# Patient Record
Sex: MALE | Race: WHITE | NOT HISPANIC OR LATINO | Employment: OTHER | URBAN - METROPOLITAN AREA
[De-identification: names, ages, dates, MRNs, and addresses within clinical notes are randomized per-mention and may not be internally consistent; named-entity substitution may affect disease eponyms.]

---

## 2017-01-05 ENCOUNTER — ALLSCRIPTS OFFICE VISIT (OUTPATIENT)
Dept: OTHER | Facility: OTHER | Age: 82
End: 2017-01-05

## 2017-01-13 ENCOUNTER — HOSPITAL ENCOUNTER (OUTPATIENT)
Dept: RADIOLOGY | Facility: MEDICAL CENTER | Age: 82
Discharge: HOME | End: 2017-01-13
Payer: MEDICARE

## 2017-01-13 ENCOUNTER — ALLSCRIPTS OFFICE VISIT (OUTPATIENT)
Dept: OTHER | Facility: OTHER | Age: 82
End: 2017-01-13

## 2017-01-13 DIAGNOSIS — Z47.1 AFTERCARE FOLLOWING JOINT REPLACEMENT SURGERY: ICD-10-CM

## 2017-01-13 PROCEDURE — 73560 X-RAY EXAM OF KNEE 1 OR 2: CPT

## 2017-01-23 DIAGNOSIS — I48.91 ATRIAL FIBRILLATION (HCC): ICD-10-CM

## 2017-01-25 ENCOUNTER — HOSPITAL ENCOUNTER (OUTPATIENT)
Dept: NON INVASIVE DIAGNOSTICS | Facility: CLINIC | Age: 82
Discharge: HOME | End: 2017-01-25
Payer: MEDICARE

## 2017-01-25 DIAGNOSIS — I65.29 OCCLUSION AND STENOSIS OF UNSPECIFIED CAROTID ARTERY: ICD-10-CM

## 2017-01-25 PROCEDURE — 93880 EXTRACRANIAL BILAT STUDY: CPT

## 2017-01-31 ENCOUNTER — GENERIC CONVERSION - ENCOUNTER (OUTPATIENT)
Dept: OTHER | Facility: OTHER | Age: 82
End: 2017-01-31

## 2017-02-23 DIAGNOSIS — I48.91 ATRIAL FIBRILLATION (HCC): ICD-10-CM

## 2017-03-08 ENCOUNTER — ALLSCRIPTS OFFICE VISIT (OUTPATIENT)
Dept: OTHER | Facility: OTHER | Age: 82
End: 2017-03-08

## 2017-04-13 DIAGNOSIS — I48.91 ATRIAL FIBRILLATION (HCC): ICD-10-CM

## 2017-04-14 ENCOUNTER — ALLSCRIPTS OFFICE VISIT (OUTPATIENT)
Dept: OTHER | Facility: OTHER | Age: 82
End: 2017-04-14

## 2017-05-01 ENCOUNTER — GENERIC CONVERSION - ENCOUNTER (OUTPATIENT)
Dept: OTHER | Facility: OTHER | Age: 82
End: 2017-05-01

## 2017-05-18 DIAGNOSIS — I48.91 ATRIAL FIBRILLATION (HCC): ICD-10-CM

## 2017-06-05 ENCOUNTER — ALLSCRIPTS OFFICE VISIT (OUTPATIENT)
Dept: OTHER | Facility: OTHER | Age: 82
End: 2017-06-05

## 2017-06-13 ENCOUNTER — LAB CONVERSION - ENCOUNTER (OUTPATIENT)
Dept: OTHER | Facility: OTHER | Age: 82
End: 2017-06-13

## 2017-06-13 ENCOUNTER — APPOINTMENT (OUTPATIENT)
Dept: LAB | Facility: HOSPITAL | Age: 82
End: 2017-06-13
Payer: MEDICARE

## 2017-06-13 ENCOUNTER — LAB REQUISITION (OUTPATIENT)
Dept: LAB | Facility: HOSPITAL | Age: 82
End: 2017-06-13
Payer: MEDICARE

## 2017-06-13 DIAGNOSIS — N40.1 ENLARGED PROSTATE WITH URINARY OBSTRUCTION: Primary | ICD-10-CM

## 2017-06-13 DIAGNOSIS — N40.1 ENLARGED PROSTATE WITH LOWER URINARY TRACT SYMPTOMS (LUTS): ICD-10-CM

## 2017-06-13 DIAGNOSIS — N13.8 ENLARGED PROSTATE WITH URINARY OBSTRUCTION: Primary | ICD-10-CM

## 2017-06-13 PROCEDURE — 88112 CYTOPATH CELL ENHANCE TECH: CPT

## 2017-06-13 PROCEDURE — 87086 URINE CULTURE/COLONY COUNT: CPT | Performed by: UROLOGY

## 2017-06-14 LAB — BACTERIA UR CULT: NORMAL

## 2017-06-29 DIAGNOSIS — I48.91 ATRIAL FIBRILLATION (HCC): ICD-10-CM

## 2017-07-05 ENCOUNTER — ALLSCRIPTS OFFICE VISIT (OUTPATIENT)
Dept: OTHER | Facility: OTHER | Age: 82
End: 2017-07-05

## 2017-07-05 ENCOUNTER — GENERIC CONVERSION - ENCOUNTER (OUTPATIENT)
Dept: OTHER | Facility: OTHER | Age: 82
End: 2017-07-05

## 2017-08-03 ENCOUNTER — HOSPITAL ENCOUNTER (EMERGENCY)
Facility: HOSPITAL | Age: 82
Discharge: HOME | End: 2017-08-03
Attending: EMERGENCY MEDICINE | Admitting: EMERGENCY MEDICINE
Payer: MEDICARE

## 2017-08-03 ENCOUNTER — APPOINTMENT (EMERGENCY)
Dept: CT IMAGING | Facility: HOSPITAL | Age: 82
End: 2017-08-03
Payer: MEDICARE

## 2017-08-03 VITALS
SYSTOLIC BLOOD PRESSURE: 155 MMHG | WEIGHT: 182.32 LBS | OXYGEN SATURATION: 95 % | BODY MASS INDEX: 27.72 KG/M2 | TEMPERATURE: 98.2 F | DIASTOLIC BLOOD PRESSURE: 84 MMHG | RESPIRATION RATE: 18 BRPM | HEART RATE: 64 BPM

## 2017-08-03 DIAGNOSIS — S00.01XA SCALP ABRASION, INITIAL ENCOUNTER: ICD-10-CM
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
## 2017-08-03 DIAGNOSIS — I48.91 ATRIAL FIBRILLATION (HCC): ICD-10-CM

## 2017-08-03 DIAGNOSIS — S09.90XA HEAD INJURY, ACUTE, INITIAL ENCOUNTER: ICD-10-CM

## 2017-08-03 DIAGNOSIS — S50.01XA CONTUSION OF RIGHT ELBOW, INITIAL ENCOUNTER: ICD-10-CM

## 2017-08-03 DIAGNOSIS — S50.311A ABRASION OF RIGHT ELBOW, INITIAL ENCOUNTER: ICD-10-CM

## 2017-08-03 LAB
ANION GAP SERPL CALCULATED.3IONS-SCNC: 5 MMOL/L (ref 4–13)
ATRIAL RATE: 258 BPM
BASOPHILS # BLD AUTO: 0.03 THOUSANDS/ΜL (ref 0–0.1)
BASOPHILS NFR BLD AUTO: 1 % (ref 0–1)
BUN SERPL-MCNC: 13 MG/DL (ref 5–25)
CALCIUM SERPL-MCNC: 8.8 MG/DL (ref 8.3–10.1)
CHLORIDE SERPL-SCNC: 106 MMOL/L (ref 100–108)
CO2 SERPL-SCNC: 29 MMOL/L (ref 21–32)
CREAT SERPL-MCNC: 0.88 MG/DL (ref 0.6–1.3)
EOSINOPHIL # BLD AUTO: 0.07 THOUSAND/ΜL (ref 0–0.61)
EOSINOPHIL NFR BLD AUTO: 1 % (ref 0–6)
ERYTHROCYTE [DISTWIDTH] IN BLOOD BY AUTOMATED COUNT: 14.6 % (ref 11.6–15.1)
GFR SERPL CREATININE-BSD FRML MDRD: 75 ML/MIN/1.73SQ M
GLUCOSE SERPL-MCNC: 84 MG/DL (ref 65–140)
HCT VFR BLD AUTO: 45.8 % (ref 36.5–49.3)
HGB BLD-MCNC: 15.1 G/DL (ref 12–17)
INR PPP: 2.62 (ref 0.86–1.16)
LYMPHOCYTES # BLD AUTO: 1.57 THOUSANDS/ΜL (ref 0.6–4.47)
LYMPHOCYTES NFR BLD AUTO: 24 % (ref 14–44)
MCH RBC QN AUTO: 29.4 PG (ref 26.8–34.3)
MCHC RBC AUTO-ENTMCNC: 33 G/DL (ref 31.4–37.4)
MCV RBC AUTO: 89 FL (ref 82–98)
MONOCYTES # BLD AUTO: 0.67 THOUSAND/ΜL (ref 0.17–1.22)
MONOCYTES NFR BLD AUTO: 10 % (ref 4–12)
NEUTROPHILS # BLD AUTO: 4.3 THOUSANDS/ΜL (ref 1.85–7.62)
NEUTS SEG NFR BLD AUTO: 64 % (ref 43–75)
PLATELET # BLD AUTO: 157 THOUSANDS/UL (ref 149–390)
PMV BLD AUTO: 10.8 FL (ref 8.9–12.7)
POTASSIUM SERPL-SCNC: 4.1 MMOL/L (ref 3.5–5.3)
PROTHROMBIN TIME: 29 SECONDS (ref 12.1–14.4)
QRS AXIS: 28 DEGREES
QRSD INTERVAL: 90 MS
QT INTERVAL: 374 MS
QTC INTERVAL: 395 MS
RBC # BLD AUTO: 5.13 MILLION/UL (ref 3.88–5.62)
SODIUM SERPL-SCNC: 140 MMOL/L (ref 136–145)
T WAVE AXIS: 33 DEGREES
TROPONIN I SERPL-MCNC: <0.02 NG/ML
TSH SERPL DL<=0.05 MIU/L-ACNC: 2.21 UIU/ML (ref 0.36–3.74)
VENTRICULAR RATE: 67 BPM
WBC # BLD AUTO: 6.64 THOUSAND/UL (ref 4.31–10.16)

## 2017-08-03 PROCEDURE — 99284 EMERGENCY DEPT VISIT MOD MDM: CPT

## 2017-08-03 PROCEDURE — 93005 ELECTROCARDIOGRAM TRACING: CPT

## 2017-08-03 PROCEDURE — 80048 BASIC METABOLIC PNL TOTAL CA: CPT | Performed by: EMERGENCY MEDICINE

## 2017-08-03 PROCEDURE — 96360 HYDRATION IV INFUSION INIT: CPT

## 2017-08-03 PROCEDURE — 85025 COMPLETE CBC W/AUTO DIFF WBC: CPT | Performed by: EMERGENCY MEDICINE

## 2017-08-03 PROCEDURE — 84484 ASSAY OF TROPONIN QUANT: CPT | Performed by: EMERGENCY MEDICINE

## 2017-08-03 PROCEDURE — 84443 ASSAY THYROID STIM HORMONE: CPT | Performed by: EMERGENCY MEDICINE

## 2017-08-03 PROCEDURE — 85610 PROTHROMBIN TIME: CPT | Performed by: EMERGENCY MEDICINE

## 2017-08-03 PROCEDURE — 70450 CT HEAD/BRAIN W/O DYE: CPT

## 2017-08-03 PROCEDURE — 36415 COLL VENOUS BLD VENIPUNCTURE: CPT | Performed by: EMERGENCY MEDICINE

## 2017-08-03 RX ORDER — LEVOTHYROXINE SODIUM 88 UG/1
88 TABLET ORAL DAILY
COMMUNITY

## 2017-08-03 RX ORDER — AMLODIPINE BESYLATE 2.5 MG/1
5 TABLET ORAL DAILY
COMMUNITY
End: 2017-11-11 | Stop reason: HOSPADM

## 2017-08-03 RX ORDER — WARFARIN SODIUM 3 MG/1
1.5 TABLET ORAL
COMMUNITY

## 2017-08-03 RX ADMIN — SODIUM CHLORIDE 500 ML: 0.9 INJECTION, SOLUTION INTRAVENOUS at 11:07

## 2017-09-08 DIAGNOSIS — I48.91 ATRIAL FIBRILLATION (HCC): ICD-10-CM

## 2017-09-08 DIAGNOSIS — E03.9 HYPOTHYROIDISM: ICD-10-CM

## 2017-10-03 ENCOUNTER — GENERIC CONVERSION - ENCOUNTER (OUTPATIENT)
Dept: OTHER | Facility: OTHER | Age: 82
End: 2017-10-03

## 2017-10-12 DIAGNOSIS — I48.91 ATRIAL FIBRILLATION (HCC): ICD-10-CM

## 2017-10-12 DIAGNOSIS — Z47.1 AFTERCARE FOLLOWING JOINT REPLACEMENT SURGERY: ICD-10-CM

## 2017-10-13 ENCOUNTER — ALLSCRIPTS OFFICE VISIT (OUTPATIENT)
Dept: OTHER | Facility: OTHER | Age: 82
End: 2017-10-13

## 2017-10-13 ENCOUNTER — APPOINTMENT (OUTPATIENT)
Dept: RADIOLOGY | Facility: MEDICAL CENTER | Age: 82
End: 2017-10-13
Payer: MEDICARE

## 2017-10-13 DIAGNOSIS — Z47.1 AFTERCARE FOLLOWING JOINT REPLACEMENT SURGERY: ICD-10-CM

## 2017-10-13 PROCEDURE — 73560 X-RAY EXAM OF KNEE 1 OR 2: CPT

## 2017-10-14 NOTE — PROGRESS NOTES
Assessment  1  Chronic pain of right knee (647 20,826 41) (M25 561,G04 29)    One year following right total knee replacement, is a well clinical radiographic exam   He understands antibiotic prophylaxis is indicated prior to invasive procedures for the next 12 months  I would welcome the opportunity see him back in the office should problems arise     Plan  Aftercare following right knee joint replacement surgery    · XR KNEE 1 OR 2 VIEW RIGHT; Status:Active - Retrospective By Protocol Authorization; Requested for:13Oct2017;   Chronic pain of right knee    · Follow-up PRN Evaluation and Treatment  Follow-up  Status: Complete  Done:  [id number] 11:30AM    History of Present Illness  HPI: One year following right total knee replacement, this 51-year-old gentleman describes very little pain in the right knee  He is lays with her reduce pain, and improved performance his right knee surgery has offered him      Review of Systems    Constitutional: No fever or chills, feels well, no tiredness, no recent weight loss or weight gain  Eyes: No complaints of red eyes, no eyesight problems  ENT: no complaints of loss of hearing, no nosebleeds, no sore throat  Cardiovascular: No complaints of chest pain, no palpitations, no leg claudication or lower extremity edema  Respiratory: No complaints of shortness of breath, no wheezing, no cough  Gastrointestinal: No complaints of abdominal pain, no constipation, no nausea or vomiting, no diarrhea or bloody stools  Genitourinary: No complaints of dysuria or incontinence, no hesitancy, no nocturia  Musculoskeletal: as noted in HPI  Integumentary: No complaints of skin rash or lesion, no itching or dry skin, no skin wounds  Neurological: No complaints of headache, no confusion, no numbness or tingling, no dizziness  Psychiatric: No suicidal thoughts, no anxiety, no depression     Endocrine: No muscle weakness, no frequent urination, no excessive thirst, no feelings of weakness  Active Problems  1  AAA (abdominal aortic aneurysm) (441 4) (I71 4)   2  Aftercare following right knee joint replacement surgery (V54 81,V43 65) (Z47 1,Z96 651)   3  AI (aortic insufficiency) (424 1) (I35 1)   4  Atrial fibrillation (427 31) (I48 91)   5  BPH (benign prostatic hyperplasia) (600 00) (N40 0)   6  Carotid stenosis (433 10) (I65 29)   7  Chronic pain of right knee (365 33,292 46) (M25 561,G89 29)   8  Cough (786 2) (R05)   9  Edema of both legs (782 3) (R60 0)   10  Hematuria, microscopic (599 72) (R31 29)   11  Hyperlipidemia (272 4) (E78 5)   12  Hypertension (401 9) (I10)   13  Hypocalcemia (275 41) (E83 51)   14  Hypokalemia (276 8) (E87 6)   15  Hypothyroidism (244 9) (E03 9)   16  Influenza (487 1) (J11 1)   17  Knee pain, right (719 46) (M25 561)   18  Lumbar foraminal stenosis (724 02) (M99 83)   19  Physician orders for life-sustaining treatment (POLST) form indicates patient wish for full    code resuscitation status (V49 89) (Z78 9)   20  Preoperative cardiovascular examination (V72 81) (Z01 810)   21  Primary osteoarthritis of right knee (715 16) (M17 11)   22  Skin lesion of face (709 9) (L98 9)   23  Somnolence, daytime (780 54) (R40 0)   24  Stasis edema of right lower extremity (459 30) (I87 301)   25  Status post total right knee replacement (V43 65) (Z96 651)   26  Swelling of calf (729 81) (M79 89)   27  Thrombocytopenia (287 5) (D69 6)   28   Urge incontinence of urine (788 31) (N39 41)    Past Medical History   · History of Abdominal aortic aneurysm, without rupture (441 4) (I71 4)   · History of amaurosis fugax (V12 49) (Z86 69)   · History of urinary tract infection (V13 02) (Z87 440)   · History of Hyperthyroidism (242 90) (E05 90)   · History of Inguinal hernia (550 90) (K40 90)   · History of Postoperative state (V45 89) (Z98 890)   · History of Shoulder pain (719 41) (M25 519)   · History of Syncope (780 2) (R55)    The active problems and past medical history were reviewed and updated today  Surgical History   · History of Endarterectomy Internal Carotid With Eversion And End-To-End Anastomosis   · History of Hernia Repair   · History of Total Knee Replacement Right    The surgical history was reviewed and updated today  Family History  Mother    · No pertinent family history    Social History   · Being A Social Drinker   · Former smoker (G59 12) (Y52 503)   ·     Current Meds   1  AmLODIPine Besylate 2 5 MG Oral Tablet; Take 1 tablet twice daily; Therapy: 52MRE7522 to (Evaluate:11Jul2017)  Requested for: 22KFU0434; Last   Rx:13Mar2017 Ordered   2  Atorvastatin Calcium 20 MG Oral Tablet; take 0 5 tablet daily; Therapy: 45SFA3520 to (Evaluate:06Jun2017)  Requested for: 29HZF3547; Last   Rx:08Mar2017 Ordered   3  EQL Fish Oil 1000 MG Oral Capsule; TAKE 1 CAPSULE DAILY; Therapy: 94MZJ5619 to (Evaluate:28Nov2017); Last Rx:10Nov2014 Ordered   4  Zoe 0 5-0 4 MG Oral Capsule (Dutasteride-Tamsulosin HCl); take 1 capsule daily; Therapy: 25LQN4475 to (Evaluate:28Nov2017); Last Rx:10Nov2014 Ordered   5  Levothyroxine Sodium 88 MCG Oral Tablet; TAKE 1 TABLET DAILY; Therapy: 13HIN9381 to [phone])  Requested for: 30Jun2017; Last   Rx:05Jun2017 Ordered   6  Potassium Chloride Debi ER 20 MEQ Oral Tablet Extended Release; take 4 tablets daily   as directed; Therapy: 15Bvk5928 to [phone])  Requested for: 32ZUF3676; Last   Rx:30Jan2017 Ordered   7  Vitamin D 1000 UNIT CAPS; TAKE 2 CAPSULE Daily; Therapy: 69VSY2288 to (Evaluate:87Xpt2635); Last Rx:10Nov2014 Ordered   8  Warfarin Sodium 3 MG Oral Tablet; Take 1/2 to 1 tablet daily by mouth or as ordered by   physician; Therapy: 83LRA9198 to (Evaluate:25Jan2018)  Requested for: 61IGZ4061; Last   Rx:30Jan2017 Ordered    Allergies  1   Penicillins    Vitals  Signs   Heart Rate: 66  Systolic: 832  Diastolic: 68  Height: 5 ft 10 in  Weight: 179 lb   BMI Calculated: 25 68  BSA Calculated: 1 99    Physical Exam  He pattern is without an amateur assistive eyes  Breathing is not labored  Right thigh devoid of atrophy  Right knee is a healed anterior incision  There is no effusion  There is no warmth  Extensions full flexion is excellent  There is no mid flexion valgus instability  There is no tenderness of the right calf  Results/Data  I personally reviewed the films/images/results in the office today  My interpretation follows  X-ray Review 2 views of the right knee show totally implant in satisfactory position  Future Appointments    Date/Time Provider Specialty Site   12/06/2017 09:00 AM Corrinne Imam, M D   [facility] [place] [facility]     Signatures   Electronically signed by : OWEN Mark ; Oct 13 2017 11:30AM EST                       (Author)

## 2017-10-17 ENCOUNTER — GENERIC CONVERSION - ENCOUNTER (OUTPATIENT)
Dept: OTHER | Facility: OTHER | Age: 82
End: 2017-10-17

## 2017-11-06 ENCOUNTER — HOSPITAL ENCOUNTER (INPATIENT)
Facility: HOSPITAL | Age: 82
LOS: 4 days | Discharge: NURSING, REHAB OR LONG-TERM CARE | DRG: 300 | End: 2017-11-11
Attending: EMERGENCY MEDICINE | Admitting: INTERNAL MEDICINE
Payer: MEDICARE

## 2017-11-06 ENCOUNTER — APPOINTMENT (EMERGENCY)
Dept: RADIOLOGY | Facility: HOSPITAL | Age: 82
DRG: 300 | End: 2017-11-06
Payer: MEDICARE

## 2017-11-06 DIAGNOSIS — I71.01 DESCENDING THORACIC AORTIC DISSECTION (HCC): Primary | ICD-10-CM

## 2017-11-06 LAB
ALBUMIN SERPL BCP-MCNC: 3.2 G/DL (ref 3.5–5)
ALP SERPL-CCNC: 65 U/L (ref 46–116)
ALT SERPL W P-5'-P-CCNC: 25 U/L (ref 12–78)
ANION GAP BLD CALC-SCNC: 13 MMOL/L (ref 4–13)
ANION GAP SERPL CALCULATED.3IONS-SCNC: 6 MMOL/L (ref 4–13)
APTT PPP: 42 SECONDS (ref 23–35)
AST SERPL W P-5'-P-CCNC: 40 U/L (ref 5–45)
BASOPHILS # BLD AUTO: 0.03 THOUSANDS/ΜL (ref 0–0.1)
BASOPHILS NFR BLD AUTO: 0 % (ref 0–1)
BILIRUB SERPL-MCNC: 0.64 MG/DL (ref 0.2–1)
BUN BLD-MCNC: 26 MG/DL (ref 5–25)
BUN SERPL-MCNC: 21 MG/DL (ref 5–25)
CA-I BLD-SCNC: 1.13 MMOL/L (ref 1.12–1.32)
CALCIUM SERPL-MCNC: 8.7 MG/DL (ref 8.3–10.1)
CHLORIDE BLD-SCNC: 107 MMOL/L (ref 100–108)
CHLORIDE SERPL-SCNC: 110 MMOL/L (ref 100–108)
CO2 SERPL-SCNC: 22 MMOL/L (ref 21–32)
CREAT BLD-MCNC: 0.9 MG/DL (ref 0.6–1.3)
CREAT SERPL-MCNC: 0.88 MG/DL (ref 0.6–1.3)
EOSINOPHIL # BLD AUTO: 0.13 THOUSAND/ΜL (ref 0–0.61)
EOSINOPHIL NFR BLD AUTO: 2 % (ref 0–6)
ERYTHROCYTE [DISTWIDTH] IN BLOOD BY AUTOMATED COUNT: 13.6 % (ref 11.6–15.1)
GFR SERPL CREATININE-BSD FRML MDRD: 74 ML/MIN/1.73SQ M
GFR SERPL CREATININE-BSD FRML MDRD: 75 ML/MIN/1.73SQ M
GLUCOSE SERPL-MCNC: 101 MG/DL (ref 65–140)
GLUCOSE SERPL-MCNC: 107 MG/DL (ref 65–140)
HCT VFR BLD AUTO: 47 % (ref 36.5–49.3)
HCT VFR BLD CALC: 48 % (ref 36.5–49.3)
HGB BLD-MCNC: 16.2 G/DL (ref 12–17)
HGB BLDA-MCNC: 16.3 G/DL (ref 12–17)
INR PPP: 2.56 (ref 0.86–1.16)
LYMPHOCYTES # BLD AUTO: 3.26 THOUSANDS/ΜL (ref 0.6–4.47)
LYMPHOCYTES NFR BLD AUTO: 39 % (ref 14–44)
MCH RBC QN AUTO: 30.5 PG (ref 26.8–34.3)
MCHC RBC AUTO-ENTMCNC: 34.5 G/DL (ref 31.4–37.4)
MCV RBC AUTO: 89 FL (ref 82–98)
MONOCYTES # BLD AUTO: 0.92 THOUSAND/ΜL (ref 0.17–1.22)
MONOCYTES NFR BLD AUTO: 11 % (ref 4–12)
NEUTROPHILS # BLD AUTO: 4.01 THOUSANDS/ΜL (ref 1.85–7.62)
NEUTS SEG NFR BLD AUTO: 48 % (ref 43–75)
NRBC BLD AUTO-RTO: 0 /100 WBCS
NT-PROBNP SERPL-MCNC: 691 PG/ML
PCO2 BLD: 25 MMOL/L (ref 21–32)
PLATELET # BLD AUTO: 147 THOUSANDS/UL (ref 149–390)
PMV BLD AUTO: 11.5 FL (ref 8.9–12.7)
POTASSIUM BLD-SCNC: 4.8 MMOL/L (ref 3.5–5.3)
POTASSIUM SERPL-SCNC: 5.8 MMOL/L (ref 3.5–5.3)
PROT SERPL-MCNC: 6.8 G/DL (ref 6.4–8.2)
PROTHROMBIN TIME: 27.8 SECONDS (ref 12.1–14.4)
RBC # BLD AUTO: 5.31 MILLION/UL (ref 3.88–5.62)
SODIUM BLD-SCNC: 139 MMOL/L (ref 136–145)
SODIUM SERPL-SCNC: 138 MMOL/L (ref 136–145)
SPECIMEN SOURCE: ABNORMAL
SPECIMEN SOURCE: NORMAL
TROPONIN I BLD-MCNC: 0 NG/ML (ref 0–0.08)
WBC # BLD AUTO: 8.39 THOUSAND/UL (ref 4.31–10.16)

## 2017-11-06 PROCEDURE — 85014 HEMATOCRIT: CPT

## 2017-11-06 PROCEDURE — 93005 ELECTROCARDIOGRAM TRACING: CPT | Performed by: EMERGENCY MEDICINE

## 2017-11-06 PROCEDURE — 85025 COMPLETE CBC W/AUTO DIFF WBC: CPT | Performed by: EMERGENCY MEDICINE

## 2017-11-06 PROCEDURE — 36415 COLL VENOUS BLD VENIPUNCTURE: CPT

## 2017-11-06 PROCEDURE — 85730 THROMBOPLASTIN TIME PARTIAL: CPT | Performed by: EMERGENCY MEDICINE

## 2017-11-06 PROCEDURE — 84484 ASSAY OF TROPONIN QUANT: CPT

## 2017-11-06 PROCEDURE — 74175 CTA ABDOMEN W/CONTRAST: CPT

## 2017-11-06 PROCEDURE — 85610 PROTHROMBIN TIME: CPT | Performed by: EMERGENCY MEDICINE

## 2017-11-06 PROCEDURE — 83880 ASSAY OF NATRIURETIC PEPTIDE: CPT | Performed by: EMERGENCY MEDICINE

## 2017-11-06 PROCEDURE — 71275 CT ANGIOGRAPHY CHEST: CPT

## 2017-11-06 PROCEDURE — 80053 COMPREHEN METABOLIC PANEL: CPT | Performed by: EMERGENCY MEDICINE

## 2017-11-06 PROCEDURE — 80047 BASIC METABLC PNL IONIZED CA: CPT

## 2017-11-06 PROCEDURE — 96361 HYDRATE IV INFUSION ADD-ON: CPT

## 2017-11-06 RX ORDER — ESMOLOL HYDROCHLORIDE 10 MG/ML
25-200 INJECTION, SOLUTION INTRAVENOUS
Status: DISCONTINUED | OUTPATIENT
Start: 2017-11-06 | End: 2017-11-08

## 2017-11-06 RX ORDER — PHYTONADIONE 5 MG/1
10 TABLET ORAL ONCE
Status: COMPLETED | OUTPATIENT
Start: 2017-11-06 | End: 2017-11-07

## 2017-11-06 RX ORDER — ASCORBIC ACID 500 MG
500 TABLET ORAL DAILY
Status: ON HOLD | COMMUNITY
End: 2017-11-07 | Stop reason: ALTCHOICE

## 2017-11-06 RX ADMIN — IOHEXOL 100 ML: 350 INJECTION, SOLUTION INTRAVENOUS at 22:48

## 2017-11-06 RX ADMIN — SODIUM CHLORIDE 1000 ML: 0.9 INJECTION, SOLUTION INTRAVENOUS at 22:08

## 2017-11-07 ENCOUNTER — APPOINTMENT (INPATIENT)
Dept: NON INVASIVE DIAGNOSTICS | Facility: HOSPITAL | Age: 82
DRG: 300 | End: 2017-11-07
Payer: MEDICARE

## 2017-11-07 PROBLEM — I71.00 INTRAMURAL AORTIC HEMATOMA (HCC): Status: ACTIVE | Noted: 2017-11-07

## 2017-11-07 LAB
ANION GAP SERPL CALCULATED.3IONS-SCNC: 10 MMOL/L (ref 4–13)
ATRIAL RATE: 340 BPM
BASOPHILS # BLD AUTO: 0 THOUSANDS/ΜL (ref 0–0.1)
BASOPHILS NFR BLD AUTO: 0 % (ref 0–1)
BUN SERPL-MCNC: 18 MG/DL (ref 5–25)
CALCIUM SERPL-MCNC: 9 MG/DL (ref 8.3–10.1)
CHLORIDE SERPL-SCNC: 107 MMOL/L (ref 100–108)
CO2 SERPL-SCNC: 24 MMOL/L (ref 21–32)
CREAT SERPL-MCNC: 0.9 MG/DL (ref 0.6–1.3)
EOSINOPHIL # BLD AUTO: 0 THOUSAND/ΜL (ref 0–0.61)
EOSINOPHIL NFR BLD AUTO: 0 % (ref 0–6)
ERYTHROCYTE [DISTWIDTH] IN BLOOD BY AUTOMATED COUNT: 13.6 % (ref 11.6–15.1)
GFR SERPL CREATININE-BSD FRML MDRD: 74 ML/MIN/1.73SQ M
GLUCOSE SERPL-MCNC: 162 MG/DL (ref 65–140)
HCT VFR BLD AUTO: 48 % (ref 36.5–49.3)
HGB BLD-MCNC: 16.6 G/DL (ref 12–17)
INR PPP: 2.59 (ref 0.86–1.16)
LYMPHOCYTES # BLD AUTO: 1.03 THOUSANDS/ΜL (ref 0.6–4.47)
LYMPHOCYTES NFR BLD AUTO: 10 % (ref 14–44)
MAGNESIUM SERPL-MCNC: 2.2 MG/DL (ref 1.6–2.6)
MCH RBC QN AUTO: 30.9 PG (ref 26.8–34.3)
MCHC RBC AUTO-ENTMCNC: 34.6 G/DL (ref 31.4–37.4)
MCV RBC AUTO: 89 FL (ref 82–98)
MONOCYTES # BLD AUTO: 0.45 THOUSAND/ΜL (ref 0.17–1.22)
MONOCYTES NFR BLD AUTO: 5 % (ref 4–12)
NEUTROPHILS # BLD AUTO: 8.34 THOUSANDS/ΜL (ref 1.85–7.62)
NEUTS SEG NFR BLD AUTO: 85 % (ref 43–75)
NRBC BLD AUTO-RTO: 0 /100 WBCS
P AXIS: 207 DEGREES
PLATELET # BLD AUTO: 137 THOUSANDS/UL (ref 149–390)
PMV BLD AUTO: 12 FL (ref 8.9–12.7)
POTASSIUM SERPL-SCNC: 3.7 MMOL/L (ref 3.5–5.3)
PROTHROMBIN TIME: 28.1 SECONDS (ref 12.1–14.4)
QRS AXIS: 30 DEGREES
QRSD INTERVAL: 82 MS
QT INTERVAL: 400 MS
QTC INTERVAL: 389 MS
RBC # BLD AUTO: 5.38 MILLION/UL (ref 3.88–5.62)
SODIUM SERPL-SCNC: 141 MMOL/L (ref 136–145)
T WAVE AXIS: 30 DEGREES
VENTRICULAR RATE: 57 BPM
WBC # BLD AUTO: 9.86 THOUSAND/UL (ref 4.31–10.16)

## 2017-11-07 PROCEDURE — 93306 TTE W/DOPPLER COMPLETE: CPT

## 2017-11-07 PROCEDURE — 85025 COMPLETE CBC W/AUTO DIFF WBC: CPT | Performed by: EMERGENCY MEDICINE

## 2017-11-07 PROCEDURE — 96374 THER/PROPH/DIAG INJ IV PUSH: CPT

## 2017-11-07 PROCEDURE — 83735 ASSAY OF MAGNESIUM: CPT | Performed by: EMERGENCY MEDICINE

## 2017-11-07 PROCEDURE — 99285 EMERGENCY DEPT VISIT HI MDM: CPT

## 2017-11-07 PROCEDURE — 85610 PROTHROMBIN TIME: CPT | Performed by: EMERGENCY MEDICINE

## 2017-11-07 PROCEDURE — 80048 BASIC METABOLIC PNL TOTAL CA: CPT | Performed by: EMERGENCY MEDICINE

## 2017-11-07 RX ORDER — LEVOTHYROXINE SODIUM 88 UG/1
88 TABLET ORAL
Status: DISCONTINUED | OUTPATIENT
Start: 2017-11-07 | End: 2017-11-11 | Stop reason: HOSPADM

## 2017-11-07 RX ORDER — ASCORBIC ACID 500 MG
500 TABLET ORAL DAILY
Status: DISCONTINUED | OUTPATIENT
Start: 2017-11-07 | End: 2017-11-07

## 2017-11-07 RX ORDER — AMLODIPINE BESYLATE 5 MG/1
5 TABLET ORAL DAILY
Status: DISCONTINUED | OUTPATIENT
Start: 2017-11-07 | End: 2017-11-07

## 2017-11-07 RX ORDER — FENTANYL CITRATE 50 UG/ML
50 INJECTION, SOLUTION INTRAMUSCULAR; INTRAVENOUS ONCE
Status: DISCONTINUED | OUTPATIENT
Start: 2017-11-07 | End: 2017-11-07

## 2017-11-07 RX ORDER — OXYCODONE HYDROCHLORIDE 5 MG/1
5 TABLET ORAL EVERY 4 HOURS PRN
Status: DISCONTINUED | OUTPATIENT
Start: 2017-11-07 | End: 2017-11-11 | Stop reason: HOSPADM

## 2017-11-07 RX ORDER — AMLODIPINE BESYLATE 10 MG/1
10 TABLET ORAL DAILY
Status: DISCONTINUED | OUTPATIENT
Start: 2017-11-08 | End: 2017-11-11 | Stop reason: HOSPADM

## 2017-11-07 RX ORDER — CHLORAL HYDRATE 500 MG
1000 CAPSULE ORAL DAILY
Status: DISCONTINUED | OUTPATIENT
Start: 2017-11-07 | End: 2017-11-07

## 2017-11-07 RX ORDER — FENTANYL CITRATE/PF 50 MCG/ML
25 SYRINGE (ML) INJECTION ONCE
Status: COMPLETED | OUTPATIENT
Start: 2017-11-07 | End: 2017-11-07

## 2017-11-07 RX ORDER — AMLODIPINE BESYLATE 5 MG/1
5 TABLET ORAL ONCE
Status: COMPLETED | OUTPATIENT
Start: 2017-11-07 | End: 2017-11-07

## 2017-11-07 RX ORDER — OXYCODONE HYDROCHLORIDE 10 MG/1
10 TABLET ORAL EVERY 6 HOURS PRN
Status: DISCONTINUED | OUTPATIENT
Start: 2017-11-07 | End: 2017-11-08

## 2017-11-07 RX ORDER — WARFARIN SODIUM 3 MG/1
3 TABLET ORAL
COMMUNITY

## 2017-11-07 RX ORDER — ATORVASTATIN CALCIUM 20 MG/1
20 TABLET, FILM COATED ORAL
Status: DISCONTINUED | OUTPATIENT
Start: 2017-11-07 | End: 2017-11-07

## 2017-11-07 RX ORDER — ATORVASTATIN CALCIUM 10 MG/1
10 TABLET, FILM COATED ORAL
Status: DISCONTINUED | OUTPATIENT
Start: 2017-11-07 | End: 2017-11-11 | Stop reason: HOSPADM

## 2017-11-07 RX ORDER — MELATONIN
1000 DAILY
Status: DISCONTINUED | OUTPATIENT
Start: 2017-11-07 | End: 2017-11-07

## 2017-11-07 RX ADMIN — PHYTONADIONE 10 MG: 5 TABLET ORAL at 02:10

## 2017-11-07 RX ADMIN — OXYCODONE HYDROCHLORIDE 5 MG: 5 TABLET ORAL at 21:18

## 2017-11-07 RX ADMIN — SODIUM CHLORIDE 4 MG/HR: 0.9 INJECTION, SOLUTION INTRAVENOUS at 07:34

## 2017-11-07 RX ADMIN — SODIUM CHLORIDE 10 MG/HR: 0.9 INJECTION, SOLUTION INTRAVENOUS at 11:45

## 2017-11-07 RX ADMIN — AMLODIPINE BESYLATE 5 MG: 5 TABLET ORAL at 12:29

## 2017-11-07 RX ADMIN — LEVOTHYROXINE SODIUM 88 MCG: 88 TABLET ORAL at 05:43

## 2017-11-07 RX ADMIN — FENTANYL CITRATE 25 MCG: 50 INJECTION INTRAMUSCULAR; INTRAVENOUS at 00:08

## 2017-11-07 RX ADMIN — ATORVASTATIN CALCIUM 10 MG: 10 TABLET, FILM COATED ORAL at 18:26

## 2017-11-07 RX ADMIN — OXYCODONE HYDROCHLORIDE 5 MG: 5 TABLET ORAL at 01:23

## 2017-11-07 RX ADMIN — SODIUM CHLORIDE 10 MG/HR: 0.9 INJECTION, SOLUTION INTRAVENOUS at 16:35

## 2017-11-07 RX ADMIN — AMLODIPINE BESYLATE 5 MG: 5 TABLET ORAL at 10:02

## 2017-11-07 RX ADMIN — METOPROLOL TARTRATE 12.5 MG: 25 TABLET ORAL at 12:29

## 2017-11-07 RX ADMIN — SODIUM CHLORIDE 5 MG/HR: 0.9 INJECTION, SOLUTION INTRAVENOUS at 00:20

## 2017-11-07 RX ADMIN — SODIUM CHLORIDE 14 MG/HR: 0.9 INJECTION, SOLUTION INTRAVENOUS at 23:44

## 2017-11-07 RX ADMIN — METOPROLOL TARTRATE 12.5 MG: 25 TABLET ORAL at 21:18

## 2017-11-07 RX ADMIN — SODIUM CHLORIDE 14 MG/HR: 0.9 INJECTION, SOLUTION INTRAVENOUS at 21:46

## 2017-11-07 RX ADMIN — OXYCODONE HYDROCHLORIDE 5 MG: 5 TABLET ORAL at 12:07

## 2017-11-07 RX ADMIN — SODIUM CHLORIDE 12 MG/HR: 0.9 INJECTION, SOLUTION INTRAVENOUS at 19:30

## 2017-11-07 NOTE — CONSULTS
Consultation - Cardiothoracic Surgery   Elina Dickens 80 y o  male MRN: [id number]  Unit/Bed#: MICU 08 Encounter: [id number]      History of Present Illness   Physician Requesting Consult: Michael Khalil MD  Reason for Consult / Principal Problem: descending aortic IMH    HPI: Elina Dickens is a 80y o  year old male who presented to [facility] via EMS with complaints of sudden onset of severe chest pain with radiation to his back 10/10 and he states it was the most severe pain he has ever experienced  At the time of the pain he was playing cards with his friends at [facility], where he resides with his 80year old wife  Upon arrival to the ER he underwent evaluation with a CTA dissection protocol  He has a noted proximal descending aortic IMH and has since been treated for pain and BP management  He is currently on IV nicardipine recently increased to 8mg/hr and was given amlodpine po this morning  Upon consultation today, he denies any chest pain, SOB, abdominal pain, numbness to arms/hands/legs/feet  He has been oob to chair this morning without issues  Consults    Review of Systems   Constitutional: Positive for activity change  Negative for appetite change, chills, diaphoresis, fatigue and fever  HENT: Negative for congestion, dental problem, ear discharge, ear pain, facial swelling, hearing loss, nosebleeds, sinus pain, sinus pressure, sore throat, tinnitus, trouble swallowing and voice change  Eyes: Negative for photophobia, pain, discharge, redness, itching and visual disturbance  Respiratory: Positive for chest tightness  Negative for apnea, shortness of breath and wheezing  Cardiovascular: Positive for chest pain  Negative for leg swelling  Chest pain as stated above  Gastrointestinal: Negative for abdominal distention, abdominal pain, blood in stool, constipation, diarrhea, nausea and vomiting     Genitourinary: Negative for decreased urine volume, difficulty urinating, dysuria, flank pain, frequency, hematuria and urgency  Musculoskeletal: Negative for arthralgias, back pain, gait problem, joint swelling, myalgias and neck stiffness  Skin: Negative for color change, pallor, rash and wound  Allergic/Immunologic: Negative for environmental allergies, food allergies and immunocompromised state  Neurological: Negative for dizziness, tremors, syncope, speech difficulty, weakness, light-headedness, numbness and headaches  Hematological: Does not bruise/bleed easily  Psychiatric/Behavioral: Negative for agitation, behavioral problems, confusion, decreased concentration, self-injury, sleep disturbance and suicidal ideas  The patient is not nervous/anxious and is not hyperactive  Historical Information   Past Medical History:   Diagnosis Date    AAA (abdominal aortic aneurysm) (HCC)     AI (aortic insufficiency)     Arthritis     OSTEO    Atrial fibrillation (HCC)     BPH (benign prostatic hyperplasia)     Coronary artery disease     Disease of thyroid gland     HYPO    Hyperlipidemia     Hyperlipidemia     Hypertension      Past Surgical History:   Procedure Laterality Date    CAROTID ENDARTARECTOMY      CAROTID ENDARTERECTOMY Left     HERNIA REPAIR      AR TOTAL KNEE ARTHROPLASTY Right 10/19/2016    Procedure: TOTAL KNEE ARTHROPLASTY ;  Surgeon: Jalen Crockett MD;  Location: [facility];  Service: Orthopedics     History   Alcohol use Not on file     History   Drug Use No     History   Smoking Status    Former Smoker    Quit date: 1990   Smokeless Tobacco    Never Used     Family History: History reviewed  No pertinent family history  Meds/Allergies   all current active meds have been reviewed  Allergies   Allergen Reactions    Amoxicillin     Penicillins Rash       Objective   Vitals: Blood pressure 140/69, pulse 68, temperature 97 6 °F (36 4 °C), temperature source Oral, resp   rate (!) 43, height 5' 10" (1 778 m), weight 80 9 kg (178 lb 5 6 oz), SpO2 96 %  Invasive Devices     Peripheral Intravenous Line            Peripheral IV 11/06/17 Right Antecubital less than 1 day    Peripheral IV 11/07/17 Left Antecubital less than 1 day                Physical Exam   Constitutional: He is oriented to person, place, and time  He appears well-developed and well-nourished  No distress  HENT:   Head: Normocephalic and atraumatic  Right Ear: External ear normal    Left Ear: External ear normal    Nose: Nose normal    Mouth/Throat: Oropharynx is clear and moist  No oropharyngeal exudate  Eyes: Conjunctivae are normal  Pupils are equal, round, and reactive to light  Right eye exhibits no discharge  Left eye exhibits no discharge  No scleral icterus  Neck: Normal range of motion  Neck supple  No JVD present  No tracheal deviation present  Cardiovascular: Regular rhythm and intact distal pulses  Slow atrial fibrillation on monitor with rates of 40 - 50    Doppler pulses in feet, palpable 2+ radial pulses   Pulmonary/Chest: Effort normal and breath sounds normal  No stridor  No respiratory distress  He has no wheezes  He has no rales  He exhibits no tenderness  Abdominal: Soft  Bowel sounds are normal  He exhibits no distension  There is no tenderness  There is no rebound and no guarding  Musculoskeletal: Normal range of motion  He exhibits no edema, tenderness or deformity  Neurological: He is alert and oriented to person, place, and time  No cranial nerve deficit  He exhibits normal muscle tone  Coordination normal    Skin: Skin is warm and dry  No rash noted  He is not diaphoretic  No erythema  No pallor  Psychiatric: He has a normal mood and affect   His behavior is normal  Judgment and thought content normal        Lab Results:   Results from last 7 days  Lab Units 11/07/17  0439 11/06/17  2203 11/06/17  2202   WBC Thousand/uL 9 86  --  8 39   HEMOGLOBIN g/dL 16 6  --  16 2   I STAT HEMOGLOBIN g/dl  --  16 3  -- HEMATOCRIT % 48 0  --  47 0   PLATELETS Thousands/uL 137*  --  147*       Results from last 7 days  Lab Units 11/07/17  0439  11/06/17  2202   SODIUM mmol/L 141  --  138   POTASSIUM mmol/L 3 7  --  5 8*   CHLORIDE mmol/L 107  --  110*   CO2 mmol/L 24  --  22   BUN mg/dL 18  --  21   CREATININE mg/dL 0 90  --  0 88   GLUCOSE RANDOM mg/dL 162*  --  101   GLUCOSE, ISTAT   --   < >  --    CALCIUM mg/dL 9 0  --  8 7   < > = values in this interval not displayed  Results from last 7 days  Lab Units 11/07/17  0439 11/06/17 2202   INR  2 59* 2 56*   PTT seconds  --  42*     No results found for: HGBA1C  Lab Results   Component Value Date    TROPONINI <0 02 08/03/2017       Imaging Studies:   CTA dissection protocol chest and abdomen 11/6/2017:  FINDINGS:      AORTA: There is high dense material identified along the descending thoracic aorta seen best on series 2 image 18 suspicious for intramural hematoma which is a finding suspicious for aortic dissection  There is no dissection flap identified  There is a   rind of soft tissue surrounding the descending aorta which likely represents wall thickening and may represent additional intramural thrombus  There is tortuosity of the thoracic aorta  The ascending aorta is normal in caliber  There is no pericardial   effusion  No inflammatory stranding surrounding the aorta  Undulating appearance to the abdominal aorta compatible with atherosclerosis with diffuse calcification present and areas of mild widening and stenosis  Right common iliac artery aneurysm noted   measuring 2 9 cm      CHEST     LUNGS:  Hypoventilatory changes are seen at the lung bases      PLEURA:  Trace right pleural effusion      HEART/PULMONARY ARTERIAL TREE:  The heart is mildly enlarged  There is no pericardial effusion   Atherosclerotic coronary calcifications are noted      MEDIASTINUM AND JOE:  Unremarkable      CHEST WALL AND LOWER NECK:       Normal      ABDOMEN     LIVER/BILIARY TREE: Unremarkable      GALLBLADDER:  There are gallstone(s) within the gallbladder, without pericholecystic inflammatory changes      SPLEEN:  Unremarkable      PANCREAS:  Focus of low-density in the pancreatic head difficult to characterize measuring approximately 2 8 cm  This has fluid signal and is likely a cyst      ADRENAL GLANDS:  Unremarkable      KIDNEYS/URETERS:  Unremarkable  No hydronephrosis      VISUALIZED STOMACH AND BOWEL:  Unremarkable  VISUALIZED ABDOMINOPELVIC CAVITY:  No ascites or free intraperitoneal air  No lymphadenopathy  ABDOMINAL WALL:  Unremarkable      OSSEOUS STRUCTURES:  Exaggeration of thoracic kyphosis      IMPRESSION:     High density along the medial wall of the proximal descending aorta suspicious for intramural hematoma  Consultation with thoracic surgery is recommended  There is no pericardial effusion  No inflammatory stranding in the mesenteric fat  Trace right   pleural effusion      Cardiomegaly without pericardial effusion  Atherosclerotic coronary artery calcifications are seen      2 8 cm cystic lesion in the pancreatic head/uncinate process  Assessment:  Patient Active Problem List    Diagnosis Date Noted    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[address] ) 11/07/2017    Thrombocytopenia ([address] ) 10/21/2016    Acute blood loss anemia 10/20/2016    Paroxysmal atrial fibrillation ([address] 10/20/2016    Hyperlipidemia 10/19/2016    Hypertension 10/19/2016    BPH (benign prostatic hyperplasia) 10/19/2016    Coronary artery disease 10/19/2016    Acquired hypothyroidism 10/19/2016    Status post total right knee replacement 10/19/2016       Plan:    Continue aggressive blood pressure management for a goal SBP of <120  He has no pain on examination today and has pain medication as he needs  Case to be discussed with Dr Grace Mejia for further management and possibility of repeat radiology testing for follow up in the 1 - 2 days      The patient was comfortable with our recommendations, and their questions were answered to their satisfaction  We will continue to evaluate the patient daily with further recommendations as work up is completed  Thank you for allowing us to participate in the care of this patient       Alfonso Barrios  10:50 AM  11/07/17

## 2017-11-07 NOTE — PROGRESS NOTES
Progress Note - Critical Care   Devon De La Cruz 80 y o  male MRN: [id number]  [facility] En[facility]: [id number]    Assessment:  60-year-old male presented for chest pain radiating to the back and found to have a intramural hematoma along the medial wall of the proximal descending aorta suspicious for aortic dissection  Plan:   Patient Active Problem List   Diagnosis    Hyperlipidemia    Hypertension    BPH (benign prostatic hyperplasia)    Coronary artery disease    Acquired hypothyroidism    Status post total right knee replacement    Acute blood loss anemia    Paroxysmal atrial fibrillation (HCC)    Thrombocytopenia ([address] )    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[address] )                    Neuro:   -patient is alert and oriented x3    -continue routine neuro checks  -pain control: Moderate - Roxicodone on 5 mg q 4 hours p r n  severe pain - Roxicodone 10 mg q 6 hours p r n  CV:   -chest pain secondary to dissect the descending thoracic aorta  -CT 11/06/2017: High density along the medial wall of the proximal descending aorta suspicious for intramural hematoma  Consultation with thoracic surgery is recommended  There is no pericardial effusion  No inflammatory stranding in the mesenteric fat  Trace right pleural effusion  Cardiomegaly without pericardial effusion  Atherosclerotic coronary artery calcifications are seen  -echocardiogram pending  -cardiothoracic surgery consulted  -cardiology consulted and will follow recommendations: 1) maintain heart rate 50-60 2) maintain systolic blood pressure 312-237  Patient on Cardene gtt, esmolol p r n   -essential hypertension: hold home amlodipine  -history of paroxysmal atrial fibrillation:  Currently rate controlled in Afib  Hold warfarin  -hyperlipidemia:  Continue home Lipitor                 Lung:   -no active pulmonary issues    Stable  -satting 93% on 2 L nasal cannula                 GI:   -no active issues  -Ct: 2 8 cm cystic lesion in the pancreatic head/uncinate process   -patient not complain of any pain, afebrile  -without transaminitis, elevated bilirubin, or of 8 alk phos  -continue monitor                 FEN:   -patient NPO  -electrolytes within normal limits  -replete electrolytes as needed                 :   -history of BPH: Stable  Patient does not have difficulty with urination  -BUN 18/creatinine 0 9  -monitor I/O                 Id:  -afebrile  -WBC 9 86  -hold antibiotics                 Heme:   -H&H currently stable 16 6/48  -no obvious signs of acute bleeding  -PT 28 1/INR 2 59:  Given 1 time dose of 10 mg vitamin K  -platelet 991  -continue to monitor, transfuse if hemoglobin less than 7 0                 Endo:   -hypothyroidism:  Continue Synthroid 88 mcg daily                            Msk/Skin:   -Osteoarthritis:  Continue vitamin-D                 Disposition:    Monitor in MICU  Pending evaluation by cardiothoracic surgery for intramural hematoma of the proximal descending thoracic aorta  Chief Complaint:  Chest pain radiating to back    HPI/24hr events:      29-year-old male presents from assisted living facility with a history of hypertension, hyperlipidemia, AFib on Coumadin presents for evaluation of sudden-onset substernal chest pain that radiates into his back  Patient states he was playing cards with his friends, had sudden onset of substernal chest pain that caused him to cut himself onto the floor out of his chair  He did not hit his head or lose consciousness  When EMS arrived the patient continued to have chest pain however he described that the pain was now radiating into his back between his shoulder blades and was severe in nature  He denies any abdominal pain any nausea, vomiting, lightheadedness, numbness tingling or weakness  Patient was given 325 mg at pre-hospital, sublingual nitroglycerin and 6 mg of morphine    His heart rate was atrial fibrillation with a rate between 40 and 50 and a blood pressure of 242 systolic pre-hospital   Upon arrival the patient did have chest pain radiating into his back severe nature  Heart rate was in the low 50s, irregular, blood pressure was 110's  Bedside ultrasound did not identify a abdominal aortic aneurysm, IV access was obtained the patient was taken for CT chest tlo evaluate for dissection    No acute events overnight  Patient in rate controlled atrial fibrillation  Currently on Cardene gtt 4 mg/hour    Physical Exam: Physical Exam   Constitutional: He is oriented to person, place, and time  He appears well-developed and well-nourished  No distress  HENT:   Head: Normocephalic and atraumatic  Right Ear: External ear normal    Left Ear: External ear normal    Mouth/Throat: Oropharynx is clear and moist  No oropharyngeal exudate  Eyes: EOM are normal  Pupils are equal, round, and reactive to light  Right eye exhibits no discharge  Left eye exhibits no discharge  No scleral icterus  Senile arcus noted   Neck: Normal range of motion  Neck supple  No JVD present  No tracheal deviation present  Cardiovascular: Normal rate, normal heart sounds and intact distal pulses  Exam reveals no gallop and no friction rub  No murmur heard  Irregular rhythm   Pulmonary/Chest: Effort normal and breath sounds normal  No respiratory distress  He has no wheezes  He has no rales  He exhibits no tenderness  Abdominal: Soft  Bowel sounds are normal  He exhibits no distension and no mass  There is no tenderness  There is no rebound and no guarding  Musculoskeletal: He exhibits no edema or tenderness  Neurological: He is alert and oriented to person, place, and time  No cranial nerve deficit or sensory deficit  Skin: Skin is warm and dry  Capillary refill takes less than 2 seconds  He is not diaphoretic  No erythema  No pallor  Psychiatric: He has a normal mood and affect   His behavior is normal          Vitals:    11/07/17 0300 11/07/17 0400 11/07/17 0500 17 0600   BP: 104/53 138/70 104/56 126/62   Pulse: 56 58 66 62   Resp: 18 22 17 20   Temp:  97 9 °F (36 6 °C)     TempSrc:  Oral     SpO2: 94% 95% 94% 93%   Weight:       Height:                 Temperature:   Temp (24hrs), Av 7 °F (36 5 °C), Min:97 5 °F (36 4 °C), Max:97 9 °F (36 6 °C)    Current: Temperature: 97 9 °F (36 6 °C)    Weights:   IBW: 73 kg    Body mass index is 25 59 kg/m²  Weight (last 2 days)     Date/Time   Weight    17 0100  80 9 (178 35)    17 2156  84 8 (187)              Hemodynamic Monitoring:  N/A     Non-Invasive/Invasive Ventilation Settings:  Respiratory    Lab Data (Last 4 hours)    None         O2/Vent Data (Last 4 hours)    None              No results found for: PHART, NAZ4NGY, PO2ART, UWM7DEO, W9REOSFP, BEART, SOURCE  SpO2: SpO2: 93 %, SpO2 Activity: SpO2 Activity: At Rest, SpO2 Device: O2 Device: Nasal cannula, Capnography:      Intake and Outputs:  I/O       701 -  -  - 700    I V  (mL/kg)  181 7 (2 2)     IV Piggyback  1000     Total Intake(mL/kg)  1181 7 (14 6)     Urine (mL/kg/hr)  250     Stool  0     Total Output   250      Net   +931 7             Unmeasured Stool Occurrence  0 x         UOP:  625 mL/24 hour   Nutrition:        Diet Orders            Start     Ordered    17  Diet Regular; Regular House  Diet effective now     Question Answer Comment   Diet Type Regular    Regular Regular House    RD to adjust diet per protocol?  Yes        17            Labs:     Results from last 7 days  Lab Units 17  0439 17  220   WBC Thousand/uL 9 86  --  8 39   HEMOGLOBIN g/dL 16 6  --  16 2   I STAT HEMOGLOBIN g/dl  --  16 3  --    HEMATOCRIT % 48 0  --  47 0   PLATELETS Thousands/uL 137*  --  147*   NEUTROS PCT % 85*  --  48   MONOS PCT % 5  --  11      Results from last 7 days  Lab Units 17  0439 17  2203 17  2202   SODIUM mmol/L 141  -- 138   POTASSIUM mmol/L 3 7  --  5 8*   CHLORIDE mmol/L 107  --  110*   CO2 mmol/L 24  --  22   BUN mg/dL 18  --  21   CREATININE mg/dL 0 90  --  0 88   CALCIUM mg/dL 9 0  --  8 7   TOTAL PROTEIN g/dL  --   --  6 8   BILIRUBIN TOTAL mg/dL  --   --  0 64   ALK PHOS U/L  --   --  65   ALT U/L  --   --  25   AST U/L  --   --  40   GLUCOSE RANDOM mg/dL 162*  --  101   GLUCOSE, ISTAT mg/dl  --  107  --        Results from last 7 days  Lab Units 11/07/17  0439   MAGNESIUM mg/dL 2 2            Results from last 7 days  Lab Units 11/07/17  0439 11/06/17  2202   INR  2 59* 2 56*   PTT seconds  --  42*           0  Lab Value Date/Time   TROPONINI <0 02 08/03/2017 1107       Imaging:  I have personally reviewed pertinent reports  EKG:  Atrial flutter  rate controlled    Micro:  Lab Results   Component Value Date    URINECX 7515-1716 cfu/ml Gram Positive Cocci 06/13/2017       Allergies: Allergies   Allergen Reactions    Amoxicillin     Penicillins Rash       Medications:   Scheduled Meds:  amLODIPine 5 mg Oral Daily   atorvastatin 10 mg Oral After Dinner   levothyroxine 88 mcg Oral Early Morning     Continuous Infusions:  esmolol  mcg/kg/min Last Rate: Stopped (11/07/17 0107)   niCARdipine 1-15 mg/hr Last Rate: 4 mg/hr (11/07/17 0416)     PRN Meds:    oxyCODONE 10 mg Q6H PRN   oxyCODONE 5 mg Q4H PRN       VTE Pharmacologic Prophylaxis: Reason for no pharmacologic prophylaxis Elevated INR in setting of suspected aortic dissection  VTE Mechanical Prophylaxis: sequential compression device    Invasive lines and devices: Invasive Devices     Peripheral Intravenous Line            Peripheral IV 11/06/17 Right Antecubital less than 1 day    Peripheral IV 11/07/17 Left Antecubital less than 1 day                   Counseling / Coordination of Care  Total Critical Care time spent 30 minutes excluding procedures, teaching and family updates         Code Status: Level 1 - Full Code     Portions of the record may have been created with voice recognition software  Occasional wrong word or "sound a like" substitutions may have occurred due to the inherent limitations of voice recognition software  Read the chart carefully and recognize, using context, where substitutions have occurred       Edward Costa, DO

## 2017-11-07 NOTE — H&P
History and Physical - Critical Care   Sahara Skinner 80 y o  male MRN: [id number]  Unit/Bed#: [facility] 23 Encounter: [id number]    Reason for Admission / Chief Complaint:   Acute chest pain      History of Present Illness:  Nanda Campbell is a 80 y o  male with PMHx of HTN, HLD, CAD, Abdominal aortic aneurysm, paroxysmal a fib, BPH, hypothyroidism, thrombocytopenia, s/p right total knee replacement October 19, 2017  Patient developed chest pain while playing cards this evening  Pain radiated to back, 10/10  Reports never having this pain before  Patient living at [facility] with wife  History obtained from chart review and the patient  Past Medical History:  Past Medical History:   Diagnosis Date    AAA (abdominal aortic aneurysm) ([address] )     AI (aortic insufficiency)     Arthritis     OSTEO    Atrial fibrillation (HCC)     BPH (benign prostatic hyperplasia)     Coronary artery disease     Disease of thyroid gland     HYPO    Hyperlipidemia     Hyperlipidemia     Hypertension        Past Surgical History:  Past Surgical History:   Procedure Laterality Date    CAROTID ENDARTARECTOMY      CAROTID ENDARTERECTOMY Left     HERNIA REPAIR      AL TOTAL KNEE ARTHROPLASTY Right 10/19/2016    Procedure: TOTAL KNEE ARTHROPLASTY ;  Surgeon: Jalen Crockett MD;  [facility];  Service: Orthopedics       Past Family History:  History reviewed  No pertinent family history      Social History:  History   Smoking Status    Former Smoker    Quit date: 1990   Smokeless Tobacco    Never Used     History   Alcohol Use No     History   Drug Use No     Marital Status: /Civil Union    Medications:  Current Facility-Administered Medications   Medication Dose Route Frequency     EMS REPLENISHMENT MED   Does not apply Once    esmolol (BREVIBLOC) 2500 mg/250 mL IV infusion (premix)   mcg/kg/min Intravenous Titrated    niCARdipine (CARDENE) 25 mg (STANDARD CONCENTRATION) in sodium chloride 0 9% 250 mL  1-15 mg/hr Intravenous Titrated    phytonadione (MEPHYTON) tablet 10 mg  10 mg Oral Once     Home medications:  Prior to Admission medications    Medication Sig Start Date End Date Taking? Authorizing Provider   amLODIPine (NORVASC) 2 5 mg tablet Take 5 mg by mouth daily   Yes Historical Provider, MD   ascorbic acid (VITAMIN C) 500 mg tablet Take 500 mg by mouth daily   Yes Historical Provider, MD   atorvastatin (LIPITOR) 20 mg tablet Take 20 mg by mouth daily after dinner   Yes Historical Provider, MD   Cholecalciferol (VITAMIN D) 2000 UNITS tablet Take 1,000 Units by mouth daily     Yes Historical Provider, MD   Dutasteride-Tamsulosin HCl (TROY) 0 5-0 4 MG CAPS Take 1 capsule by mouth daily   Yes Historical Provider, MD   levothyroxine 88 mcg tablet Take 88 mcg by mouth daily   Yes Historical Provider, MD   Omega-3 Fatty Acids (FISH OIL) 1,000 mg Take 1,000 mg by mouth daily   Yes Historical Provider, MD   potassium chloride (K-DUR,KLOR-CON) 20 mEq tablet Take 20 mEq by mouth daily TAKE 4 TABS DAILY   Yes Historical Provider, MD   warfarin (COUMADIN) 3 mg tablet Take 3 mg by mouth daily Pt  Reports 6 mg on Sunday  Pt  Thinks that is right dose for coumadin   Yes Historical Provider, MD     Allergies: Allergies   Allergen Reactions    Penicillins Rash       ROS:   Review of Systems   Constitutional: Negative for fatigue  HENT: Negative for congestion  Eyes: Negative for visual disturbance  Respiratory: Positive for chest tightness  Cardiovascular: Positive for chest pain  Gastrointestinal: Negative for abdominal distention  Endocrine: Negative for polyuria  Genitourinary: Negative for dysuria  Musculoskeletal: Positive for back pain  Skin: Negative for color change  Allergic/Immunologic: Negative for immunocompromised state  Neurological: Negative for dizziness  Psychiatric/Behavioral: Negative for agitation and behavioral problems         Vitals:  Vitals: 17 2315   BP: 118/62 (!) 193/93   Pulse: (!) 48 60   Resp: 22 20   Temp: 97 6 °F (36 4 °C)    TempSrc: Oral    SpO2: 98% 96%   Weight: 84 8 kg (187 lb)    Height: 5' 10" (1 778 m)      Temperature:   Temp (24hrs), Av 6 °F (36 4 °C), Min:97 6 °F (36 4 °C), Max:97 6 °F (36 4 °C)    Current Temperature: 97 6 °F (36 4 °C)    Weights:   IBW: 73 kg  Body mass index is 26 83 kg/m²  Non-Invasive/Invasive Ventilation Settings:  SpO2 Activity: SpO2 Activity: At Rest  Respiratory    Lab Data (Last 4 hours)    None         O2/Vent Data (Last 4 hours)    None              Respiratory:  Ventilator Settings:         No results found for: PHART, QEY2XVB, PO2ART, ASJ9RNH, X9QMNQNN, BEART, SOURCE  SpO2 Activity: SpO2 Activity: At Rest     Physical Exam:  Physical Exam   Constitutional: He is oriented to person, place, and time  He appears well-developed and well-nourished  No distress  HENT:   Head: Normocephalic  Eyes: Pupils are equal, round, and reactive to light  No scleral icterus  Neck: Normal range of motion  Cardiovascular: Exam reveals no gallop and no friction rub  Irregular rythm   Pulmonary/Chest: Effort normal and breath sounds normal  No respiratory distress  He has no wheezes  He has no rales  He exhibits no tenderness  Abdominal: Soft  He exhibits no distension and no mass  There is tenderness  There is no rebound and no guarding  Musculoskeletal: Normal range of motion  He exhibits no edema  Dry scaly  LE   Neurological: He is alert and oriented to person, place, and time  Skin: Skin is dry  No rash noted  He is not diaphoretic  No erythema  No pallor  Psychiatric: He has a normal mood and affect   His behavior is normal  Thought content normal        Labs:    Results from last 7 days  Lab Units 17   WBC Thousand/uL  --  8 39   HEMOGLOBIN g/dL  --  16 2   I STAT HEMOGLOBIN g/dl 16 3  --    HEMATOCRIT %  --  47 0   PLATELETS Thousands/uL  -- 147*   NEUTROS PCT %  --  48   MONOS PCT %  --  11        Results from last 7 days  Lab Units 11/06/17  2203 11/06/17  2202   SODIUM mmol/L  --  138   POTASSIUM mmol/L  --  5 8*   CHLORIDE mmol/L  --  110*   CO2 mmol/L  --  22   BUN mg/dL  --  21   CREATININE mg/dL  --  0 88   CALCIUM mg/dL  --  8 7   TOTAL PROTEIN g/dL  --  6 8   BILIRUBIN TOTAL mg/dL  --  0 64   ALK PHOS U/L  --  65   ALT U/L  --  25   AST U/L  --  40   GLUCOSE RANDOM mg/dL  --  101   GLUCOSE, ISTAT mg/dl 107  --                 Results from last 7 days  Lab Units 11/06/17  2202   INR  2 56*   PTT seconds 42*           0  Lab Value Date/Time   TROPONINI <0 02 08/03/2017 1107       Imaging:  I have personally reviewed pertinent reports  EKG: This was personally reviewed by myself  Micro:  Lab Results   Component Value Date    URINECX 8395-0824 cfu/ml Gram Positive Cocci 06/13/2017       ______________________________________________________________________    Assessment:   Patient Active Problem List   Diagnosis    Hyperlipidemia    Hypertension    BPH (benign prostatic hyperplasia)    Coronary artery disease    Acquired hypothyroidism    Status post total right knee replacement    Acute blood loss anemia    Paroxysmal atrial fibrillation (HCC)    Thrombocytopenia (HCC)     No problem-specific Assessment & Plan notes found for this encounter  Assessment:  Conchita Brown is a 80 y o  male with PMHx of HTN, HLD, CAD, Abdominal aortic aneurysm, paroxysmal a fib, BPH, hypothyroidism, thrombocytopenia  Acute onset of chest pain with radiation to back  CT results in dissection of descending throasic aorta  Plan:     Neuro:   Neurochecks routine  Pain control ( 2n2 to Dissecting aorta)   - Moderate pain: Roxicodone 5mg q4hrs prn   - Severe pain: Roxicodone 10mg q6hrs prn       CV: Hx of AAA, HTN, HLD, CAD    1  Chest pain: 2n2 to Dissected Descending thoracic aorta  -CT (11/6/17):  High density along the medial wall of the proximal descending aorta suspicious for intramural hematoma  Consultation with thoracic surgery is recommended  There is no pericardial effusion  No inflammatory stranding in the mesenteric fat  Trace right pleural effusion  Cardiomegaly without pericardial effusion  Atherosclerotic coronary artery calcifications are seen   -Cardiology consulted : will follow recommendations   -Maintain Hrt rate : 50-60   -Maintain SBP: 100-120: Place Cardene gtt, Esmolol prn   -Pain: s/p fentanyl 25mg x1    -Held home regimen of amlodipine    2  Hx of HTN  -Held amlodipine  Currently on Cardene gtt  -Held home regimen of amlodipine    3  Hx of Parox A fib  -rate controlled  -Held coumadin     4  Hx of HLD  - Continue home regimen of Lipitor 20mg QD, fish oil QD      Lung:   Stable  Sat O2 96% on 2L nasal cannula      GI:   NPO      FEN:   Replete lytes as needed  Na 138, K 5 8      :   Hx BPH: stable  Cr 0 88, BUN 21  Monitor I/o      ID:   Afebrile   WBC: 8 39    Heme: Hx of thrombocytopenia    PT: 27 8, INR 2 56: given Vit K 10mg once   PTT 42  -Held his coumadin   - Platelets 184      Endo: Hx of Hypothyroid  -Continue home regimen of Synthroid 88cmg QD    Msk/Skin:   Hx of OA: continue Vit D      Disposition: Patient appropriate for critical care level     ______________________________________________________________________    VTE Pharmacologic Prophylaxis: Reason for no pharmacologic prophylaxis Increase INR  VTE Mechanical Prophylaxis: sequential compression device    Invasive lines and devices: Invasive Devices     Peripheral Intravenous Line            Peripheral IV 11/06/17 Right Antecubital less than 1 day                Code Status: Level 1 - Full Code  POA:    POLST:      Given critical illness, patient length of stay will require greater than two midnights        Fabiola Mayers MD  [address] [place] [address] Family Medicine PGY2  11/7/2017  12:11 AM

## 2017-11-07 NOTE — ED ATTENDING ATTESTATION
Keesha Kapadia MD, saw and evaluated the patient  I have discussed the patient with the resident/non-physician practitioner and agree with the resident's/non-physician practitioner's findings, Plan of Care, and MDM as documented in the resident's/non-physician practitioner's note, except where noted  All available labs and Radiology studies were reviewed  At this point I agree with the current assessment done in the Emergency Department  I have conducted an independent evaluation of this patient a history and physical is as follows:      Critical Care Time  CritCare Time  OA: 79 y/o m c/o acute onset of CP that occurred while playing a card game  Pain initially substernal and quickly radiated to his back with a tearing sensation  Pt did fall from his chair at onset of pain, denies head strike  - n/v  - lightheadedness/dizziness/headache  Pt denies radiation of pain to his LE but generally feels week at time of ED evaluation  Of note, chest pain resolved however back pain is persistent  On exam, pt is in slow atrial fibrillation with HR from 402-60s, BP stable on arrival  Elderly m but AAOx3 and interactive, NC/AT, MMM, PERRL, neck supple/FROM/-midline ttp, irregularly irregular rhythm, - murmurs, lungs decreased at b/l bases, - reproducible back pain, abs soft, NT/ND, +BS, -r/g, - palpable mass, baseline LE edema with venous status changes, FROM, intact sensation and strength, intact distal pulses, capillary refill 2 sec, AAOx3   A/p CP with radiation to back, concern for thoracic dissection, bedside BP equal bilaterally, send for CTA, continue cardiac evaluation, will treat accordingly, admit

## 2017-11-07 NOTE — ED PROVIDER NOTES
History  No chief complaint on file  HPI    Prior to Admission Medications   Prescriptions Last Dose Informant Patient Reported? Taking? Cholecalciferol (VITAMIN D) 2000 UNITS tablet   Yes No   Sig: Take 1,000 Units by mouth daily     Dutasteride-Tamsulosin HCl (TROY) 0 5-0 4 MG CAPS   Yes No   Sig: Take 1 capsule by mouth daily   Omega-3 Fatty Acids (FISH OIL) 1,000 mg   Yes No   Sig: Take 1,000 mg by mouth daily   amLODIPine (NORVASC) 2 5 mg tablet   Yes No   Sig: Take 5 mg by mouth daily   atorvastatin (LIPITOR) 20 mg tablet   Yes No   Sig: Take 20 mg by mouth daily after dinner   levothyroxine 88 mcg tablet   Yes No   Sig: Take 88 mcg by mouth daily   potassium chloride (K-DUR,KLOR-CON) 20 mEq tablet   Yes No   Sig: Take 20 mEq by mouth daily TAKE 4 TABS DAILY   warfarin (COUMADIN) 3 mg tablet   Yes No   Sig: Take 3 mg by mouth daily Pt  Reports 6 mg on Sunday  Pt  Thinks that is right dose for coumadin      Facility-Administered Medications: None       Past Medical History:   Diagnosis Date    AAA (abdominal aortic aneurysm) (HCC)     AI (aortic insufficiency)     Arthritis     OSTEO    Atrial fibrillation (HCC)     BPH (benign prostatic hyperplasia)     Coronary artery disease     Disease of thyroid gland     HYPO    Hyperlipidemia     Hyperlipidemia     Hypertension        Past Surgical History:   Procedure Laterality Date    CAROTID ENDARTARECTOMY      CAROTID ENDARTERECTOMY Left     HERNIA REPAIR      MI TOTAL KNEE ARTHROPLASTY Right 10/19/2016    Procedure: TOTAL KNEE ARTHROPLASTY ;  Surgeon: Vega Cobb MD;  Location[facility];  Service: Orthopedics       No family history on file  I have reviewed and agree with the history as documented      Social History   Substance Use Topics    Smoking status: Former Smoker     Quit date: 1990    Smokeless tobacco: Never Used    Alcohol use No        Review of Systems    Physical Exam  ED Triage Vitals   Temp Pulse Resp BP SpO2   -- -- -- -- --      Temp src Heart Rate Source Patient Position - Orthostatic VS BP Location FiO2 (%)   -- -- -- -- --      Pain Score       --           Orthostatic Vital Signs  There were no vitals filed for this visit  Physical Exam    ED Medications  Medications - No data to display    Diagnostic Studies  Results Reviewed     None                 No orders to display         Procedures  Procedures      Phone Consults  ED Phone Contact    ED Course  ED Course                                [facility]  CritCare Time    Disposition  Final diagnoses:   None     ED Disposition     None      Follow-up Information    None       Patient's Medications   Discharge Prescriptions    No medications on file     No discharge procedures on file  ED Provider  Attending physically available and evaluated Georgeedith Aleks WILLIAMSON managed the patient along with the ED Attending      Electronically Signed by

## 2017-11-07 NOTE — ED PROVIDER NOTES
History  Chief Complaint   Patient presents with    Chest Pain     midsternal chest pain starting tonight pressure and ache in nature  pain radiating to neck  heart rate in 40's - 80's - no relief with nitro      68-year-old male presents from assisted living facility with a history of hypertension, hyperlipidemia, AFib on Coumadin presents for evaluation of sudden-onset substernal chest pain that radiates into his back  Patient states he was playing cards with his friends, had sudden onset of substernal chest pain that caused him to cut himself onto the floor out of his chair  He did not hit his head or lose consciousness  When EMS arrived the patient continued to have chest pain however he described that the pain was now radiating into his back between his shoulder blades and was severe in nature  He denies any abdominal pain any nausea, vomiting, lightheadedness, numbness tingling or weakness  Patient was given 325 mg at pre-hospital, sublingual nitroglycerin and 6 mg of morphine  His heart rate was atrial fibrillation with a rate between 40 and 50 and a blood pressure of 551 systolic pre-hospital   Upon arrival the patient did have chest pain radiating into his back severe nature  Heart rate was in the low 50s, irregular, blood pressure was 110's  Bedside ultrasound did not identify a abdominal aortic aneurysm, IV access was obtained the patient was taken for CT chest tlo evaluate for dissection            Prior to Admission Medications   Prescriptions Last Dose Informant Patient Reported? Taking?    Dutasteride-Tamsulosin HCl (TROY) 0 5-0 4 MG CAPS 11/6/2017  Yes Yes   Sig: Take 1 capsule by mouth daily   Mirabegron (MYRBETRIQ PO)   Yes Yes   Sig: Take 50 mg by mouth daily   amLODIPine (NORVASC) 2 5 mg tablet 11/6/2017  Yes Yes   Sig: Take 5 mg by mouth daily   atorvastatin (LIPITOR) 20 mg tablet 11/6/2017  Yes Yes   Sig: Take 10 mg by mouth daily after dinner     levothyroxine 88 mcg tablet 11/6/2017 Yes Yes   Sig: Take 88 mcg by mouth daily   potassium chloride (K-DUR,KLOR-CON) 20 mEq tablet 11/6/2017  Yes Yes   Sig: Take 80 mEq by mouth daily TAKE 4 TABS DAILY     warfarin (COUMADIN) 3 mg tablet 11/6/2017  Yes Yes   Sig: Take 1 5 mg by mouth daily Except Monday    warfarin (COUMADIN) 3 mg tablet   Yes Yes   Sig: Take 3 mg by mouth Monday      Facility-Administered Medications: None       Past Medical History:   Diagnosis Date    AAA (abdominal aortic aneurysm) (HCC)     AI (aortic insufficiency)     Arthritis     OSTEO    Atrial fibrillation (HCC)     BPH (benign prostatic hyperplasia)     Coronary artery disease     Disease of thyroid gland     HYPO    Hyperlipidemia     Hyperlipidemia     Hypertension        Past Surgical History:   Procedure Laterality Date    CAROTID ENDARTARECTOMY      CAROTID ENDARTERECTOMY Left     HERNIA REPAIR      MI TOTAL KNEE ARTHROPLASTY Right 10/19/2016    Procedure: TOTAL KNEE ARTHROPLASTY ;  Surgeon: Noa Martinez MD;  Location: [facility];  Service: Orthopedics       History reviewed  No pertinent family history  I have reviewed and agree with the history as documented  Social History   Substance Use Topics    Smoking status: Former Smoker     Quit date: 1990    Smokeless tobacco: Never Used    Alcohol use Not on file        Review of Systems   Constitutional: Negative for chills, fatigue and fever  HENT: Negative for congestion, rhinorrhea and sinus pressure  Eyes: Negative for visual disturbance  Respiratory: Negative for cough, chest tightness, shortness of breath and wheezing  Cardiovascular: Positive for chest pain  Negative for palpitations and leg swelling  Gastrointestinal: Negative for abdominal distention, abdominal pain, blood in stool, constipation, diarrhea, nausea and vomiting  Genitourinary: Negative for dysuria, flank pain and urgency  Musculoskeletal: Positive for back pain  Negative for neck pain     Skin: Negative for rash    Neurological: Negative for dizziness, syncope, speech difficulty, weakness, light-headedness, numbness and headaches  Hematological: Negative for adenopathy  Physical Exam  ED Triage Vitals   Temperature Pulse Respirations Blood Pressure SpO2   11/06/17 2156 11/06/17 2156 11/06/17 2156 11/06/17 2156 11/06/17 2156   97 6 °F (36 4 °C) (!) 48 22 118/62 98 %      Temp Source Heart Rate Source Patient Position - Orthostatic VS BP Location FiO2 (%)   11/06/17 2156 11/06/17 2156 11/06/17 2156 11/06/17 2156 --   Oral Monitor Sitting Right arm       Pain Score       11/06/17 2000       6           Orthostatic Vital Signs  Vitals:    11/07/17 0245 11/07/17 0300 11/07/17 0400 11/07/17 0500   BP: 101/57 104/53 138/70 104/56   Pulse: 56 56 58 66   Patient Position - Orthostatic VS:   Lying        Physical Exam   Constitutional: He is oriented to person, place, and time  He appears well-developed and well-nourished  No distress  Appears moderately uncomfortable   HENT:   Head: Normocephalic and atraumatic  Head is without raccoon's eyes, without Mitchell's sign, without abrasion and without contusion  Mouth/Throat: Oropharynx is clear and moist  No oropharyngeal exudate  Eyes: Conjunctivae and EOM are normal  Pupils are equal, round, and reactive to light  Neck: Normal range of motion, full passive range of motion without pain and phonation normal  Neck supple  No JVD present  No spinous process tenderness and no muscular tenderness present  Carotid bruit is not present  Normal range of motion present  Cardiovascular: Intact distal pulses  An irregular rhythm present  Bradycardia present  No murmur heard  Pulses:       Radial pulses are 2+ on the right side, and 2+ on the left side  Dorsalis pedis pulses are 2+ on the right side, and 2+ on the left side  Pulmonary/Chest: Effort normal and breath sounds normal  No tachypnea  He has no decreased breath sounds  He has no wheezes   He has no rhonchi  He has no rales  He exhibits no tenderness, no crepitus and no edema  Abdominal: Soft  Normal appearance  He exhibits no distension and no mass  There is no tenderness  There is no rigidity, no rebound, no guarding and no CVA tenderness  Musculoskeletal: Normal range of motion  Lymphadenopathy:     He has no cervical adenopathy  Neurological: He is alert and oriented to person, place, and time  He has normal strength  He is not disoriented  No cranial nerve deficit or sensory deficit  He exhibits normal muscle tone  GCS eye subscore is 4  GCS verbal subscore is 5  GCS motor subscore is 6  Unremarkable cranial nerve exam  Pupils 4 mm equal round reactive to light  No nystagmus, normal extraocular motion  5 out of 5 upper and lower extremity strength  No subjective sensory deficits to the face, upper or lower extremity  Normal finger-nose and heel shin  Skin: Skin is warm, dry and intact  Capillary refill takes less than 2 seconds  No rash noted  He is not diaphoretic  Psychiatric: He has a normal mood and affect  Nursing note and vitals reviewed        ED Medications  Medications   esmolol (BREVIBLOC) 2500 mg/250 mL IV infusion (premix) (0 mcg/kg/min × 84 8 kg Intravenous Hold 11/7/17 0107)   niCARdipine (CARDENE) 25 mg (STANDARD CONCENTRATION) in sodium chloride 0 9% 250 mL (4 mg/hr Intravenous Restarted 11/7/17 0416)   oxyCODONE (ROXICODONE) IR tablet 5 mg (5 mg Oral Given 11/7/17 0123)   oxyCODONE (ROXICODONE) immediate release tablet 10 mg (not administered)   levothyroxine tablet 88 mcg (not administered)   atorvastatin (LIPITOR) tablet 10 mg (not administered)   amLODIPine (NORVASC) tablet 5 mg (not administered)    EMS REPLENISHMENT MED ( Does not apply Given to EMS 11/7/17 0033)   sodium chloride 0 9 % bolus 1,000 mL (0 mL Intravenous Stopped 11/6/17 2308)   iohexol (OMNIPAQUE) 350 MG/ML injection (MULTI-DOSE) 100 mL (100 mL Intravenous Given 11/6/17 2248)   phytonadione (MEPHYTON) tablet 10 mg (10 mg Oral Given 11/7/17 0210)   fentaNYL (SUBLIMAZE) injection 25 mcg (25 mcg Intravenous Given 11/7/17 0008)       Diagnostic Studies  Results Reviewed     Procedure Component Value Units Date/Time    Basic metabolic panel [[id number]] Collected:  11/07/17 0439    Lab Status: In process Specimen:  Blood from Arm, Left Updated:  11/07/17 0507    Magnesium [[id number]] Collected:  11/07/17 0439    Lab Status: In process Specimen:  Blood from Arm, Left Updated:  11/07/17 0507    Protime-INR [[id number]] Collected:  11/07/17 0439    Lab Status: In process Specimen:  Blood from Arm, Left Updated:  11/07/17 0507    CBC with differential [[id number]] Collected:  11/07/17 0439    Lab Status: In process Specimen:  Blood from Arm, Left Updated:  11/07/17 0507    Comprehensive metabolic panel [[id number]]  (Abnormal) Collected:  11/06/17 2202    Lab Status:  Final result Specimen:  Blood from Arm, Left Updated:  11/06/17 2348     Sodium 138 mmol/L      Potassium 5 8 (H) mmol/L      Chloride 110 (H) mmol/L      CO2 22 mmol/L      Anion Gap 6 mmol/L      BUN 21 mg/dL      Creatinine 0 88 mg/dL      Glucose 101 mg/dL      Calcium 8 7 mg/dL      AST 40 U/L      ALT 25 U/L      Alkaline Phosphatase 65 U/L      Total Protein 6 8 g/dL      Albumin 3 2 (L) g/dL      Total Bilirubin 0 64 mg/dL      eGFR 75 ml/min/1 73sq m     Narrative:         National Kidney Disease Education Program recommendations are as follows:  GFR calculation is accurate only with a steady state creatinine  Chronic Kidney disease less than 60 ml/min/1 73 sq  meters  Kidney failure less than 15 ml/min/1 73 sq  meters      B-type natriuretic peptide [[id number]]  (Abnormal) Collected:  11/06/17 2202    Lab Status:  Final result Specimen:  Blood from Arm, Left Updated:  11/06/17 2348     NT-proBNP 691 (H) pg/mL     Protime-INR [[id number]]  (Abnormal) Collected:  11/06/17 2202    Lab Status:  Final result Specimen:  Blood from Arm, Left Updated:  11/06/17 2319     Protime 27 8 (H) seconds      INR 2 56 (H)    APTT [[id number]]  (Abnormal) Collected:  11/06/17 2202    Lab Status:  Final result Specimen:  Blood from Arm, Left Updated:  11/06/17 2319     PTT 42 (H) seconds     Narrative: Therapeutic Heparin Range = 60-90 seconds    CBC and differential [[id number]]  (Abnormal) Collected:  11/06/17 2202    Lab Status:  Final result Specimen:  Blood from Arm, Left Updated:  11/06/17 2215     WBC 8 39 Thousand/uL      RBC 5 31 Million/uL      Hemoglobin 16 2 g/dL      Hematocrit 47 0 %      MCV 89 fL      MCH 30 5 pg      MCHC 34 5 g/dL      RDW 13 6 %      MPV 11 5 fL      Platelets 304 (L) Thousands/uL      nRBC 0 /100 WBCs      Neutrophils Relative 48 %      Lymphocytes Relative 39 %      Monocytes Relative 11 %      Eosinophils Relative 2 %      Basophils Relative 0 %      Neutrophils Absolute 4 01 Thousands/µL      Lymphocytes Absolute 3 26 Thousands/µL      Monocytes Absolute 0 92 Thousand/µL      Eosinophils Absolute 0 13 Thousand/µL      Basophils Absolute 0 03 Thousands/µL     POCT troponin [[id number]]  (Normal) Collected:  11/06/17 2201    Lab Status:  Final result Updated:  11/06/17 2215     POC Troponin I 0 00 ng/ml      Specimen Type VENOUS    Narrative:         [employer] i-Stat handheld analyzer 99% cutoff is > 0 08ng/mL in [facility] Emergency Departments    o cTnI 99% cutoff is useful only when applied to patients in the clinical setting of myocardial ischemia  o cTnI 99% cutoff should be interpreted in the context of clinical history, ECG findings and possibly cardiac imaging to establish correct diagnosis  o cTnI 99% cutoff may be suggestive but clearly not indicative of a coronary event without the clinical setting of myocardial ischemia      POCT Chem 8+ [[id number]]  (Abnormal) Collected:  11/06/17 2203    Lab Status:  Final result Updated:  11/06/17 2208     SODIUM, I-STAT 139 mmol/l      Potassium, i-STAT 4 8 mmol/L      Chloride, istat 107 mmol/L CO2, i-STAT 25 mmol/L      Anion Gap, Istat 13 mmol/L      Calcium, Ionized i-STAT 1 13 mmol/L      BUN, I-STAT 26 (H) mg/dl      Creatinine, i-STAT 0 9 mg/dl      eGFR 74 ml/min/1 73sq m      Glucose, i-STAT 107 mg/dl      Hct, i-STAT 48 %      Hgb, i-STAT 16 3 g/dl      Specimen Type VENOUS                 CTA dissection protocol chest and abdomen   Final Result by Cristian Owen DO (11/06 2333)      High density along the medial wall of the proximal descending aorta suspicious for intramural hematoma  Consultation with thoracic surgery is recommended  There is no pericardial effusion  No inflammatory stranding in the mesenteric fat  Trace right    pleural effusion  Cardiomegaly without pericardial effusion  Atherosclerotic coronary artery calcifications are seen  2 8 cm cystic lesion in the pancreatic head/uncinate process  ##cfslh   I personally discussed this result with Faraz Campbell on 11/6/2017 11:27 PM    ##         Workstation performed: [id number]               Procedures  ECG 12 Lead Documentation  Date/Time: 11/6/2017 10:15 PM  Performed by: Nikolas Daniel by: Carlos Wing     Indications / Diagnosis:   chest pain  ECG reviewed by me, the ED Provider: yes    Patient location:  ED  Previous ECG:     Previous ECG:  Compared to current    Similarity:  Changes noted  Interpretation:     Interpretation: abnormal    Rate:     ECG rate assessment: bradycardic    Rhythm:     Rhythm: atrial fibrillation    Ectopy:     Ectopy: none    QRS:     QRS axis:  Normal  ST segments:     ST segments:  Normal  T waves:     T waves: normal            Phone Consults  ED Phone Contact    ED Course  ED Course as of Nov 07 0520 Mon Nov 06, 2017   2200 Called ct to expedite study    2217 POC Troponin I: 0 00   2217  EKG:  AFib, rate 57  No significant ST changes    Changes compared to 8/3/17, still in AFib but rate 67     2233  Patient taken to CT scan    2257 Requested stat read    2325 Reading room rad: Intramural hematoma present  Dissection into descending  after sub, start  No flap  2344  Discussed with Dr Christopher newton of cardiac surgery who is aware patient's CT findings and presentation  He recommends blood pressure and heart rate control with asthma all Cardene, is where patient's anticoagulant status an INR of 2 5 recommends 10 of p o  Vitamin K, no [facility] and he will see the patient in consultation tomorrow and request admission to the MICU service    [phone] Micu aware    Tue Nov 07, 2017   0036   Discussed results with  daughter,  who was where patient's   Findings today  Attempted to call spouse there was no answer  MDM  CritCare Time    Disposition  Final diagnoses:   Descending thoracic aortic dissection ([address] )     Time reflects when diagnosis was documented in both MDM as applicable and the Disposition within this note     Time User Action Codes Description Comment    11/7/2017 12:09 AM Parviz Masker ALEJANDRO Add [I71 01] Descending thoracic aortic dissection [facility])       ED Disposition     ED Disposition Condition Comment    Admit  Case was discussed with MICU and the patient's admission status was agreed to be Admission Status: inpatient status to the service of Dr Jarad Mcduffie           Follow-up Information    None       Current Discharge Medication List      CONTINUE these medications which have NOT CHANGED    Details   amLODIPine (NORVASC) 2 5 mg tablet Take 5 mg by mouth daily      atorvastatin (LIPITOR) 20 mg tablet Take 10 mg by mouth daily after dinner        Dutasteride-Tamsulosin HCl (TROY) 0 5-0 4 MG CAPS Take 1 capsule by mouth daily      levothyroxine 88 mcg tablet Take 88 mcg by mouth daily      Mirabegron (MYRBETRIQ PO) Take 50 mg by mouth daily      potassium chloride (K-DUR,KLOR-CON) 20 mEq tablet Take 80 mEq by mouth daily TAKE 4 TABS DAILY        !! warfarin (COUMADIN) 3 mg tablet Take 1 5 mg by mouth daily Except Monday       !! warfarin (COUMADIN) 3 mg tablet Take 3 mg by mouth Monday       !! - Potential duplicate medications found  Please discuss with provider  No discharge procedures on file  ED Provider  Attending physically available and evaluated Nagi Huitron  I managed the patient along with the ED Attending      Electronically Signed by         Meseret Garber DO  Resident  11/07/17 9372

## 2017-11-07 NOTE — PLAN OF CARE
Problem: Potential for Falls  Goal: Patient will remain free of falls  INTERVENTIONS:  - Assess patient frequently for physical needs  -  Identify cognitive and physical deficits and behaviors that affect risk of falls    -  [place] fall precautions as indicated by assessment   - Educate patient/family on patient safety including physical limitations  - Instruct patient to call for assistance with activity based on assessment  - Modify environment to reduce risk of injury  - Consider OT/PT consult to assist with strengthening/mobility   Outcome: Progressing      Problem: Prexisting or High Potential for Compromised Skin Integrity  Goal: Skin integrity is maintained or improved  INTERVENTIONS:  - Identify patients at risk for skin breakdown  - Assess and monitor skin integrity  - Assess and monitor nutrition and hydration status  - Monitor labs (i e  albumin)  - Assess for incontinence   - Turn and reposition patient  - Assist with mobility/ambulation  - Relieve pressure over bony prominences  - Avoid friction and shearing  - Provide appropriate hygiene as needed including keeping skin clean and dry  - Evaluate need for skin moisturizer/barrier cream  - Collaborate with interdisciplinary team (i e  Nutrition, Rehabilitation, etc )   - Patient/family teaching   Outcome: Progressing      Problem: PAIN - ADULT  Goal: Verbalizes/displays adequate comfort level or baseline comfort level  Interventions:  - Encourage patient to monitor pain and request assistance  - Assess pain using appropriate pain scale  - Administer analgesics based on type and severity of pain and evaluate response  - Implement non-pharmacological measures as appropriate and evaluate response  - Consider cultural and social influences on pain and pain management  - Notify physician/advanced practitioner if interventions unsuccessful or patient reports new pain  Outcome: Progressing      Problem: DISCHARGE PLANNING  Goal: Discharge to home or other facility with appropriate resources  INTERVENTIONS:  - Identify barriers to discharge w/patient and caregiver  - Arrange for needed discharge resources and transportation as appropriate  - Identify discharge learning needs (meds, wound care, etc )  - Arrange for interpretive services to assist at discharge as needed  - Refer to Case Management Department for coordinating discharge planning if the patient needs post-hospital services based on physician/advanced practitioner order or complex needs related to functional status, cognitive ability, or social support system  Outcome: Progressing      Problem: Knowledge Deficit  Goal: Patient/family/caregiver demonstrates understanding of disease process, treatment plan, medications, and discharge instructions  Complete learning assessment and assess knowledge base  Interventions:  - Provide teaching at level of understanding  - Provide teaching via preferred learning methods  Outcome: Progressing      Problem: CARDIOVASCULAR - ADULT  Goal: Maintains optimal cardiac output and hemodynamic stability  INTERVENTIONS:  - Monitor I/O, vital signs and rhythm  - Monitor for S/S and trends of decreased cardiac output i e  bleeding, hypotension  - Administer and titrate ordered vasoactive medications to optimize hemodynamic stability  - Assess quality of pulses, skin color and temperature  - Assess for signs of decreased coronary artery perfusion - ex   Angina  - Instruct patient to report change in severity of symptoms  Outcome: Progressing      Problem: RESPIRATORY - ADULT  Goal: Achieves optimal ventilation and oxygenation  INTERVENTIONS:  - Assess for changes in respiratory status  - Assess for changes in mentation and behavior  - Position to facilitate oxygenation and minimize respiratory effort  - Oxygen administration by appropriate delivery method based on oxygen saturation (per order) or ABGs  - Initiate smoking cessation education as indicated  - Encourage broncho-pulmonary hygiene including cough, deep breathe, Incentive Spirometry  - Assess the need for suctioning and aspirate as needed  - Assess and instruct to report SOB or any respiratory difficulty  - Respiratory Therapy support as indicated  Outcome: Progressing      Problem: METABOLIC, FLUID AND ELECTROLYTES - ADULT  Goal: Electrolytes maintained within normal limits  INTERVENTIONS:  - Monitor labs and assess patient for signs and symptoms of electrolyte imbalances  - Administer electrolyte replacement as ordered  - Monitor response to electrolyte replacements, including repeat lab results as appropriate  - Instruct patient on fluid and nutrition as appropriate  Outcome: Progressing

## 2017-11-07 NOTE — CASE MANAGEMENT
Initial Clinical Review    Admission: Date/Time/Statement: 11/7/17 @ 0006     Orders Placed This Encounter   Procedures    Inpatient Admission     Standing Status:   Standing     Number of Occurrences:   1     Order Specific Question:   Admitting Physician     Answer:   Nain Aguiar [[id number]]     Order Specific Question:   Level of Care     Answer:   Critical Care [15]     Order Specific Question:   Estimated length of stay     Answer:   More than 2 Midnights     Order Specific Question:   Certification     Answer:   I certify that inpatient services are medically necessary for this patient for a duration of greater than two midnights  See H&P and MD Progress Notes for additional information about the patient's course of treatment  ED: Date/Time/Mode of Arrival:   ED Arrival Information     Expected Arrival Acuity Means of Arrival Escorted By Service Admission Type    11/6/2017 21:40 11/6/2017 21:52 [facility] Critical Care/ICU Emergency    Arrival Complaint    chest pain          Chief Complaint:   Chief Complaint   Patient presents with    Chest Pain     midsternal chest pain starting tonight pressure and ache in nature  pain radiating to neck  heart rate in 40's - 80's - no relief with nitro        History of Samuella Lanes is a 80 y o  male with PMHx of HTN, HLD, CAD, Abdominal aortic aneurysm, paroxysmal a fib, BPH, hypothyroidism, thrombocytopenia, s/p right total knee replacement October 19, 2017  Patient developed chest pain while playing cards this evening  Pain radiated to back, 10/10  Reports never having this pain before  Patient living at [facility] with wife       ED Vital Signs:   ED Triage Vitals   Temperature Pulse Respirations Blood Pressure SpO2   11/06/17 2156 11/06/17 2156 11/06/17 2156 11/06/17 2156 11/06/17 2156   97 6 °F (36 4 °C) (!) 48 22 118/62 98 % 2 liters NC O2       Temp Source Heart Rate Source Patient Position - Orthostatic VS BP Location FiO2 (%)   11/06/17 2156 11/06/17 2156 11/06/17 2156 11/06/17 2156 --   Oral Monitor Sitting Right arm       Pain Score       11/06/17 2000       6        Wt Readings from Last 1 Encounters:   11/07/17 80 9 kg (178 lb 5 6 oz)         Abnormal Labs/Diagnostic Test Results:       Ref Range & Units 11/7/17 0439 11/6/17 2203 11/6/17 2202   Sodium 136 - 145 mmol/L 141   138    Potassium 3 5 - 5 3 mmol/L 3 7   5 8CM     Chloride 100 - 108 mmol/L 107   110     CO2 21 - 32 mmol/L 24   22    Anion Gap 4 - 13 mmol/L 10   6    BUN 5 - 25 mg/dL 18   21    Creatinine 0 60 - 1 30 mg/dL 0 90   0 88CM    Glucose 65 - 140 mg/dL 162    101CM    Calcium 8 3 - 10 1 mg/dL 9 0   8 7    eGFR ml/min/1 73sq m 74  74  75               Ref Range & Units 11/7/17 0439 11/6/17 2202   WBC 4 31 - 10 16 Thousand/uL 9 86  8 39    RBC 3 88 - 5 62 Million/uL 5 38  5 31    Hemoglobin 12 0 - 17 0 g/dL 16 6  16 2    Hematocrit 36 5 - 49 3 % 48 0  47 0    MCV 82 - 98 fL 89  89    MCH 26 8 - 34 3 pg 30 9  30 5    MCHC 31 4 - 37 4 g/dL 34 6  34 5    RDW 11 6 - 15 1 % 13 6  13 6    MPV 8 9 - 12 7 fL 12 0  11 5    Platelets 691 - 858 Thousands/uL 137   147     nRBC /100 WBCs 0  0    Neutrophils Relative 43 - 75 % 85   48    Lymphocytes Relative 14 - 44 % 10   39    Monocytes Relative 4 - 12 % 5  11    Eosinophils Relative 0 - 6 % 0  2    Basophils Relative 0 - 1 % 0  0    Neutrophils Absolute 1 85 - 7 62 Thousands/µL 8 34   4 01    Lymphocytes Absolute 0 60 - 4 47 Thousands/µL 1 03  3 26    Monocytes Absolute 0 17 - 1 22 Thousand/µL 0 45  0 92    Eosinophils Absolute 0 00 - 0 61 Thousand/µL 0 00  0 13    Basophils Absolute 0 00 - 0 10 Thousands/µL 0 00  0 03                    CTA Chest  -   High density along the medial wall of the proximal descending aorta suspicious for intramural hematoma  Consultation with thoracic surgery is recommended  There is no pericardial effusion  No inflammatory stranding in the mesenteric fat  Trace right Pleural Effusion        Cardiomegaly without pericardial effusion  Atherosclerotic coronary artery calcifications are seen        2 8 cm cystic lesion in the pancreatic head/uncinate process  EKG - Ashly Carmona - Aparna     ED Treatment:   Medication Administration from 11/06/2017 2140 to 11/07/2017 4882       Date/Time Order Dose Route Action Action by Comments     11/06/2017 2208 sodium chloride 0 9 % bolus 1,000 mL 1,000 mL Intravenous New Bag Sabina Olivares RN      11/06/2017 2248 iohexol (OMNIPAQUE) 350 MG/ML injection (MULTI-DOSE) 100 mL 100 mL Intravenous Given Eulene Bath      11/07/2017 0020 niCARdipine (CARDENE) 25 mg (STANDARD CONCENTRATION) in sodium chloride 0 9% 250 mL 5 mg/hr Intravenous New Bag Quincy Wall RN      11/07/2017 0008 fentaNYL (SUBLIMAZE) injection 25 mcg 25 mcg Intravenous Given Quincy Wall RN           Past Medical/Surgical History:     Past Medical History:   Diagnosis Date    AAA (abdominal aortic aneurysm) (HCC)     AI (aortic insufficiency)     Arthritis     Atrial fibrillation (HCC)     BPH (benign prostatic hyperplasia)     Coronary artery disease     Disease of thyroid gland     Hyperlipidemia     Hyperlipidemia     Hypertension      Past Surgical History:   Procedure Laterality Date    CAROTID ENDARTARECTOMY        CAROTID ENDARTERECTOMY Left      HERNIA REPAIR        [place] TOTAL KNEE ARTHROPLASTY Right 10/19/2016     Procedure: TOTAL KNEE ARTHROPLASTY ;  Surgeon: Shane Ford MD;  Location: [facility];  Service: Orthopedics             Admitting Diagnosis: Chest pain [R07 9]  Descending thoracic aortic dissection ([address] ) [I71 01]    Age/Sex: 80 y o  male    Assessment/Plan:   Plan:      Neuro:   Neurochecks routine  Pain control ( 2n2 to Dissecting aorta)              - Moderate pain: Roxicodone 5mg q4hrs prn              - Severe pain: Roxicodone 10mg q6hrs prn                   CV: Hx of AAA, HTN, HLD, CAD     1   Chest pain: 2n2 to Dissected Descending thoracic aorta  -CT (11/6/17): High density along the medial wall of the proximal descending aorta suspicious for intramural hematoma  Consultation with thoracic surgery is recommended  There is no pericardial effusion  No inflammatory stranding in the mesenteric fat  Trace right pleural effusion  Cardiomegaly without pericardial effusion  Atherosclerotic coronary artery calcifications are seen   -Cardiology consulted : will follow recommendations              -Maintain Hrt rate : 50-60              -Maintain SBP: 100-120: Place Cardene gtt, Esmolol prn   -Pain: s/p fentanyl 25mg x1     -Held home regimen of amlodipine     2  Hx of HTN  -Held amlodipine  Currently on Cardene gtt  -Held home regimen of amlodipine     3  Hx of Parox A fib  -rate controlled  -Held coumadin      4  Hx of HLD  - Continue home regimen of Lipitor 20mg QD, fish oil QD        Lung:   Stable  Sat O2 96% on 2L nasal cannula        GI:   NPO        FEN:   Replete lytes as needed  Na 138, K 5 8        :   Hx BPH: stable  Cr 0 88, BUN 21  Monitor I/o        ID:    Afebrile   WBC: 8 39     Heme: Hx of thrombocytopenia     PT: 27 8, INR 2 56: given Vit K 10mg once   PTT 42  -Held his coumadin   - Platelets 253        Endo: Hx of Hypothyroid  -Continue home regimen of Synthroid 88cmg QD     Msk/Skin:   Hx of OA: continue Vit D          Admission Orders:  Scheduled Meds:   amLODIPine 5 mg Oral Daily   atorvastatin 10 mg Oral After Dinner   levothyroxine 88 mcg Oral Early Morning     Continuous Infusions:   esmolol  mcg/kg/min Last Rate: Stopped (11/07/17 0107)   niCARdipine 1-15 mg/hr Last Rate: 8 mg/hr (11/07/17 1031)     PRN Meds: oxyCODONE    oxyCODONE 5 mg po 11/7 x 1    Nursing Orders  -  telem - Daily weights - Sequential compression device - Neurovascular checks - Diet regualr house     Consult  CT surgery

## 2017-11-08 LAB
ANION GAP SERPL CALCULATED.3IONS-SCNC: 10 MMOL/L (ref 4–13)
BUN SERPL-MCNC: 27 MG/DL (ref 5–25)
CALCIUM SERPL-MCNC: 8.6 MG/DL (ref 8.3–10.1)
CHLORIDE SERPL-SCNC: 109 MMOL/L (ref 100–108)
CO2 SERPL-SCNC: 23 MMOL/L (ref 21–32)
CREAT SERPL-MCNC: 1.14 MG/DL (ref 0.6–1.3)
GFR SERPL CREATININE-BSD FRML MDRD: 56 ML/MIN/1.73SQ M
GLUCOSE SERPL-MCNC: 146 MG/DL (ref 65–140)
INR PPP: 1.6 (ref 0.86–1.16)
POTASSIUM SERPL-SCNC: 4 MMOL/L (ref 3.5–5.3)
PROTHROMBIN TIME: 19.2 SECONDS (ref 12.1–14.4)
SODIUM SERPL-SCNC: 142 MMOL/L (ref 136–145)

## 2017-11-08 PROCEDURE — 85610 PROTHROMBIN TIME: CPT | Performed by: NURSE PRACTITIONER

## 2017-11-08 PROCEDURE — 80048 BASIC METABOLIC PNL TOTAL CA: CPT | Performed by: INTERNAL MEDICINE

## 2017-11-08 RX ORDER — EPINEPHRINE 1 MG/ML(1)
AMPUL (ML) INJECTION
Status: DISPENSED
Start: 2017-11-08 | End: 2017-11-08

## 2017-11-08 RX ORDER — DOCUSATE SODIUM 100 MG/1
100 CAPSULE, LIQUID FILLED ORAL DAILY
Status: DISCONTINUED | OUTPATIENT
Start: 2017-11-08 | End: 2017-11-11 | Stop reason: HOSPADM

## 2017-11-08 RX ORDER — METOPROLOL TARTRATE 5 MG/5ML
5 INJECTION INTRAVENOUS ONCE
Status: COMPLETED | OUTPATIENT
Start: 2017-11-08 | End: 2017-11-08

## 2017-11-08 RX ORDER — SENNOSIDES 8.6 MG
1 TABLET ORAL
Status: DISCONTINUED | OUTPATIENT
Start: 2017-11-08 | End: 2017-11-11 | Stop reason: HOSPADM

## 2017-11-08 RX ORDER — WARFARIN SODIUM 1 MG/1
1.5 TABLET ORAL
Status: DISCONTINUED | OUTPATIENT
Start: 2017-11-08 | End: 2017-11-11 | Stop reason: HOSPADM

## 2017-11-08 RX ORDER — WARFARIN SODIUM 1 MG/1
1.5 TABLET ORAL
Status: DISCONTINUED | OUTPATIENT
Start: 2017-11-09 | End: 2017-11-08

## 2017-11-08 RX ORDER — FUROSEMIDE 20 MG/1
20 TABLET ORAL ONCE
Status: COMPLETED | OUTPATIENT
Start: 2017-11-08 | End: 2017-11-08

## 2017-11-08 RX ORDER — HYDRALAZINE HYDROCHLORIDE 20 MG/ML
5 INJECTION INTRAMUSCULAR; INTRAVENOUS EVERY 6 HOURS PRN
Status: DISCONTINUED | OUTPATIENT
Start: 2017-11-08 | End: 2017-11-09

## 2017-11-08 RX ORDER — WARFARIN SODIUM 1 MG/1
3 TABLET ORAL
Status: DISCONTINUED | OUTPATIENT
Start: 2017-11-13 | End: 2017-11-11 | Stop reason: HOSPADM

## 2017-11-08 RX ORDER — FUROSEMIDE 10 MG/ML
20 INJECTION INTRAMUSCULAR; INTRAVENOUS ONCE
Status: DISCONTINUED | OUTPATIENT
Start: 2017-11-08 | End: 2017-11-08

## 2017-11-08 RX ADMIN — WARFARIN SODIUM 1.5 MG: 1 TABLET ORAL at 23:27

## 2017-11-08 RX ADMIN — SENNOSIDES 8.6 MG: 8.6 TABLET, FILM COATED ORAL at 23:26

## 2017-11-08 RX ADMIN — SODIUM CHLORIDE 15 MG/HR: 0.9 INJECTION, SOLUTION INTRAVENOUS at 07:31

## 2017-11-08 RX ADMIN — DOCUSATE SODIUM 100 MG: 100 CAPSULE, LIQUID FILLED ORAL at 10:46

## 2017-11-08 RX ADMIN — SODIUM CHLORIDE 15 MG/HR: 0.9 INJECTION, SOLUTION INTRAVENOUS at 09:28

## 2017-11-08 RX ADMIN — SODIUM CHLORIDE 15 MG/HR: 0.9 INJECTION, SOLUTION INTRAVENOUS at 03:48

## 2017-11-08 RX ADMIN — AMLODIPINE BESYLATE 10 MG: 10 TABLET ORAL at 08:12

## 2017-11-08 RX ADMIN — METOROPROLOL TARTRATE 5 MG: 5 INJECTION, SOLUTION INTRAVENOUS at 02:14

## 2017-11-08 RX ADMIN — ATORVASTATIN CALCIUM 10 MG: 10 TABLET, FILM COATED ORAL at 19:13

## 2017-11-08 RX ADMIN — METOPROLOL TARTRATE 12.5 MG: 25 TABLET ORAL at 08:07

## 2017-11-08 RX ADMIN — LEVOTHYROXINE SODIUM 88 MCG: 88 TABLET ORAL at 07:53

## 2017-11-08 RX ADMIN — FUROSEMIDE 20 MG: 20 TABLET ORAL at 10:46

## 2017-11-08 NOTE — PLAN OF CARE
CARDIOVASCULAR - ADULT     Maintains optimal cardiac output and hemodynamic stability Progressing        DISCHARGE PLANNING     Discharge to home or other facility with appropriate resources Progressing        DISCHARGE PLANNING - CARE MANAGEMENT     Discharge to post-acute care or home with appropriate resources Progressing        Knowledge Deficit     Patient/family/caregiver demonstrates understanding of disease process, treatment plan, medications, and discharge instructions Progressing        METABOLIC, FLUID AND ELECTROLYTES - ADULT     Electrolytes maintained within normal limits Progressing        Nutrition/Hydration-ADULT     Nutrient/Hydration intake appropriate for improving, restoring or maintaining nutritional needs Progressing        PAIN - ADULT     Verbalizes/displays adequate comfort level or baseline comfort level Progressing        Potential for Falls     Patient will remain free of falls Progressing        Prexisting or High Potential for Compromised Skin Integrity     Skin integrity is maintained or improved Progressing        RESPIRATORY - ADULT     Achieves optimal ventilation and oxygenation Progressing

## 2017-11-08 NOTE — PROGRESS NOTES
Progress Note - ICU Transfer to Step down/med  surg  Prem Skinner 80 y o  male MRN: [id number]    [facility]: [id number]      Code Status: Level 1 - Full Code    Date of ICU admission:  11/07/2017    Reason for ICU adm: chest pain radiating to the back and found to have a intramural hematoma along the medial wall of the proximal descending aorta suspicious for aortic dissection      Active problems:   Patient Active Problem List   Diagnosis    Hyperlipidemia    Hypertension    BPH (benign prostatic hyperplasia)    Coronary artery disease    Acquired hypothyroidism    Status post total right knee replacement    Acute blood loss anemia    Paroxysmal atrial fibrillation (HCC)    Thrombocytopenia (HCC)    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HCC)       Summary of clinical course:     51-year-old male presents from assisted living facility with a history of hypertension, hyperlipidemia, AFib on Coumadin presents for evaluation of sudden-onset substernal chest pain that radiates into his back   Patient states he was playing cards with his friends, had sudden onset of substernal chest pain that caused him to fall out of his chair   He did not hit his head or lose consciousness   When EMS arrived the patient continued to have chest pain however he described that the pain was now radiating into his back between his shoulder blades and was severe in nature   He denies any abdominal pain any nausea, vomiting, lightheadedness, numbness tingling or weakness   Patient was given 325 mg aspirin at pre-hospital, sublingual nitroglycerin and 6 mg of morphine   His heart rate was atrial fibrillation with a rate between 40 and 50 and a blood pressure of 482 systolic pre-hospital   Upon arrival the patient did have chest pain radiating into his back severe nature   Heart rate was in the low 50s, irregular, blood pressure was 110's   Bedside ultrasound did not identify a abdominal aortic aneurysm, IV access was obtained the patient was taken for CT chest to evaluate for dissection  CT findings showed a intramural hematoma at the proximal descending aorta; suspicious for aortic dissection  Patient was transferred to the MICU with a Cardene gtt for hemodynamic monitoring, strict heart rate and blood pressure control, and cardiac surgery evaluation  Goal heart rate 07-20 and systolic blood pressure less than 120  The patient remained asymptomatic and had mild mid thoracic back pain  Cardiac surgery deemed that the patient did not need any intervention and recommended medical management with antihypertensive therapy  With the course of his MICU stay the patient was weaned off his Cardene gtt  His amlodipine dose was increased from 5 mg daily to 10 mg daily  Patient was started on Lopressor 12 5 mg q 12 hours with holding parameters for heart rate less than 55 beats per minute and systolic blood pressure less than 110  The patient remained in rate controlled atrial fibrillation  His Coumadin was resumed  Patient was deemed stable for transfer out of the MICU to med/surg with telemetry monitor  Neuro:   -patient is alert and oriented x3    -continue routine neuro checks  -pain control:  Roxicodone p r n                   CV:   -chest pain secondary to type B intramural hematoma  -echocardiogram 11/07/2017:  Ejection fraction 70% with no regional wall motion abnormalities  Markedly dilated left atrium   -cardiothoracic surgery evaluated the patient:  No plans for intervention at this time and continue medical management with antihypertensive therapy  -cardiology consulted and will follow recommendations: 1) maintain heart rate 50-60 2) maintain systolic blood pressure 676-764    -patient required up titration of Cardene gtt to 15 milligrams/hour overnight   Currently off cardene gtt   -increase amlodipine 10 mg daily to amlodipine 10 mg b i d   -history of paroxysmal atrial fibrillation:  Currently rate controlled in Afib    resume warfarin  Last INR 2 59   -hyperlipidemia:  Continue home Lipitor                 Lung:   -no active pulmonary issues  Stable  -satting 94% on 2 L nasal cannula                 GI:   -no active issues                 FEN:   -regular house diet  -electrolytes within normal limits  -replete electrolytes as needed                 :   -history of BPH: Stable    Patient does not have difficulty with urination  -BUN 27/creatinine 1 14  -monitor I/O                 Id:  -No active infectious process  -hold antibiotics                 Heme:   -H&H currently stable 16 6/48  -no obvious signs of acute bleeding  -continue to monitor, transfuse if hemoglobin less than 7 0                 Endo:   -hypothyroidism:  Continue Synthroid 88 mcg daily                            Msk/Skin:   -Osteoarthritis:  Continue vitamin-D    Recent or scheduled procedures:  None    Outstanding/pending diagnostics:  None    Hospital discharge planning:  Patient will need PT/OT evaluation and follow up with Cardiology

## 2017-11-08 NOTE — PROGRESS NOTES
Progress Note - Critical Care   True Half 80 y o  male MRN: [id number]  Unit/Bed#: MICU 08 Encounter: [id number]    Assessment:  80-year-old male presented for chest pain radiating to the back and found to have a intramural hematoma along the medial wall of the proximal descending aorta suspicious for aortic dissection  Intermural hematoma secondary to aortic dissection, hypertension, Coumadin coagulopathy, paroxysmal atrial fibrillation, hyperlipidemia    Plan:   Patient Active Problem List   Diagnosis    Hyperlipidemia    Hypertension    BPH (benign prostatic hyperplasia)    Coronary artery disease    Acquired hypothyroidism    Status post total right knee replacement    Acute blood loss anemia    Paroxysmal atrial fibrillation (HCC)    Thrombocytopenia ([facility] [address] )    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[facility] [address] )                    Neuro:   -patient is alert and oriented x3    -continue routine neuro checks  -pain control:  Roxicodone p r n  CV:   -chest pain secondary to type B intramural hematoma  -echocardiogram 11/07/2017:  Ejection fraction 70% with no regional wall motion abnormalities  Markedly dilated left atrium   -cardiothoracic surgery evaluated the patient:  No plans for intervention at this time and continue medical management with antihypertensive therapy  -cardiology consulted and will follow recommendations: 1) maintain heart rate 50-60 2) maintain systolic blood pressure 260-157  Patient on Cardene gtt  Esmolol on standby    -patient required up titration of Cardene gtt to 15 milligrams/hour overnight  Goal is to wean patient off Cardene gtt    -increase amlodipine 10 mg daily to amlodipine 10 mg b i d   -history of paroxysmal atrial fibrillation:  Currently rate controlled in Afib  Hold warfarin  Last INR 2 59   -hyperlipidemia:  Continue home Lipitor                 Lung:   -no active pulmonary issues    Stable  -satting 94% on 2 L nasal cannula                 GI: -no active issues                 FEN:   -regular house diet  -electrolytes within normal limits  -replete electrolytes as needed                 :   -history of BPH: Stable  Patient does not have difficulty with urination  -BUN 27/creatinine 1 14  -monitor I/O                 Id:  -No active infectious process  -hold antibiotics                 Heme:   -H&H currently stable 16 6/48  -no obvious signs of acute bleeding  -continue to monitor, transfuse if hemoglobin less than 7 0                 Endo:   -hypothyroidism:  Continue Synthroid 88 mcg daily                            Msk/Skin:   -Osteoarthritis:  Continue vitamin-D                 Disposition:    Step-down level 1 care  Continue to wean off Cardene gtt  Chief Complaint:  Chest pain radiating to back    HPI/24hr events:      40-year-old male presents from assisted living facility with a history of hypertension, hyperlipidemia, AFib on Coumadin presents for evaluation of sudden-onset substernal chest pain that radiates into his back  Patient states he was playing cards with his friends, had sudden onset of substernal chest pain that caused him to cut himself onto the floor out of his chair  He did not hit his head or lose consciousness  When EMS arrived the patient continued to have chest pain however he described that the pain was now radiating into his back between his shoulder blades and was severe in nature  He denies any abdominal pain any nausea, vomiting, lightheadedness, numbness tingling or weakness  Patient was given 325 mg at pre-hospital, sublingual nitroglycerin and 6 mg of morphine  His heart rate was atrial fibrillation with a rate between 40 and 50 and a blood pressure of 597 systolic pre-hospital   Upon arrival the patient did have chest pain radiating into his back severe nature  Heart rate was in the low 50s, irregular, blood pressure was 110's    Bedside ultrasound did not identify a abdominal aortic aneurysm, IV access was obtained the patient was taken for CT chest tlo evaluate for dissection    No acute events overnight  Patient in rate controlled atrial fibrillation  Patient required Cardene gtt to be up titrated to 15mg/hr and 1 time dose of metoprolol 5 mg Iv  Physical Exam: Physical Exam   Constitutional: He is oriented to person, place, and time  He appears well-developed and well-nourished  No distress  HENT:   Head: Normocephalic and atraumatic  Right Ear: External ear normal    Left Ear: External ear normal    Mouth/Throat: Oropharynx is clear and moist  No oropharyngeal exudate  Eyes: EOM are normal  Pupils are equal, round, and reactive to light  Right eye exhibits no discharge  Left eye exhibits no discharge  No scleral icterus  Senile arcus noted   Neck: Normal range of motion  Neck supple  No JVD present  No tracheal deviation present  Cardiovascular: Normal rate, normal heart sounds and intact distal pulses  Exam reveals no gallop and no friction rub  No murmur heard  Irregular rhythm   Pulmonary/Chest: Effort normal and breath sounds normal  No respiratory distress  He has no wheezes  He has no rales  He exhibits no tenderness  Abdominal: Soft  Bowel sounds are normal  He exhibits no distension and no mass  There is no tenderness  There is no rebound and no guarding  Musculoskeletal: He exhibits edema  He exhibits no tenderness  +1 pitting edema bilateral lower extremities   Neurological: He is alert and oriented to person, place, and time  No cranial nerve deficit or sensory deficit  Skin: Skin is warm and dry  Capillary refill takes less than 2 seconds  He is not diaphoretic  No erythema  No pallor  Psychiatric: He has a normal mood and affect   His behavior is normal          Vitals:    11/08/17 0300 11/08/17 0400 11/08/17 0500 11/08/17 0600   BP: 119/55 105/54 103/59 104/58   Pulse: (!) 52 (!) 52 58 (!) 48   Resp: 15 (!) 27 (!) 25 20   Temp:  98 1 °F (36 7 °C)     TempSrc:  Oral SpO2: 94% 93% 94%    Weight:       Height:                 Temperature:   Temp (24hrs), Av 5 °F (36 4 °C), Min:96 6 °F (35 9 °C), Max:98 1 °F (36 7 °C)    Current: Temperature: 98 1 °F (36 7 °C)    Weights:   IBW: 73 kg    Body mass index is 25 59 kg/m²  Weight (last 2 days)     Date/Time   Weight    17 0100  80 9 (178 35)    17 2156  84 8 (187)              Hemodynamic Monitoring:  N/A     Non-Invasive/Invasive Ventilation Settings:  Respiratory    Lab Data (Last 4 hours)    None         O2/Vent Data (Last 4 hours)    None              No results found for: PHART, LJH8RKO, PO2ART, UDR4EPP, H4IPBEKQ, BEART, SOURCE  SpO2: SpO2: 94 %, SpO2 Activity: SpO2 Activity: At Rest, SpO2 Device: O2 Device: Nasal cannula, Capnography: Capnography: No    Intake and Outputs:  I/O       701 -  -  - 700    I V  (mL/kg)  181 7 (2 2)     IV Piggyback  1000     Total Intake(mL/kg)  1181 7 (14 6)     Urine (mL/kg/hr)  250     Stool  0     Total Output   250      Net   +931 7             Unmeasured Stool Occurrence  0 x         UOP:  625 mL/24 hour   Nutrition:        Diet Orders            Start     Ordered    17  Diet Regular; Regular House  Diet effective now     Question Answer Comment   Diet Type Regular    Regular Regular House    RD to adjust diet per protocol?  Yes        17            Labs:     Results from last 7 days  Lab Units 17   WBC Thousand/uL 9 86  --  8 39   HEMOGLOBIN g/dL 16 6  --  16 2   I STAT HEMOGLOBIN g/dl  --  16 3  --    HEMATOCRIT % 48 0  --  47 0   PLATELETS Thousands/uL 137*  --  147*   NEUTROS PCT % 85*  --  48   MONOS PCT % 5  --  11        Results from last 7 days  Lab Units 17  0708 17  04317   SODIUM mmol/L 142 141  --  138   POTASSIUM mmol/L 4 0 3 7  --  5 8*   CHLORIDE mmol/L 109* 107  --  110*   CO2 mmol/L 23 24  --  22 BUN mg/dL 27* 18  --  21   CREATININE mg/dL 1 14 0 90  --  0 88   CALCIUM mg/dL 8 6 9 0  --  8 7   TOTAL PROTEIN g/dL  --   --   --  6 8   BILIRUBIN TOTAL mg/dL  --   --   --  0 64   ALK PHOS U/L  --   --   --  65   ALT U/L  --   --   --  25   AST U/L  --   --   --  40   GLUCOSE RANDOM mg/dL 146* 162*  --  101   GLUCOSE, ISTAT mg/dl  --   --  107  --        Results from last 7 days  Lab Units 11/07/17  0439   MAGNESIUM mg/dL 2 2            Results from last 7 days  Lab Units 11/07/17  0439 11/06/17  2202   INR  2 59* 2 56*   PTT seconds  --  42*           0  Lab Value Date/Time   TROPONINI <0 02 08/03/2017 1107       Imaging:  I have personally reviewed pertinent reports  EKG:  Atrial flutter  rate controlled    Micro:  Lab Results   Component Value Date    URINECX 0453-5717 cfu/ml Gram Positive Cocci 06/13/2017       Allergies: Allergies   Allergen Reactions    Amoxicillin     Penicillins Rash       Medications:   Scheduled Meds:    EPINEPHrine      amLODIPine 10 mg Oral Daily   atorvastatin 10 mg Oral After Dinner   levothyroxine 88 mcg Oral Early Morning   metoprolol tartrate 12 5 mg Oral Q12H [address]     Continuous Infusions:    esmolol  mcg/kg/min Last Rate: Stopped (11/07/17 0107)   niCARdipine 1-15 mg/hr Last Rate: 15 mg/hr (11/08/17 0731)     PRN Meds:    oxyCODONE 10 mg Q6H PRN   oxyCODONE 5 mg Q4H PRN       VTE Pharmacologic Prophylaxis: Reason for no pharmacologic prophylaxis Elevated INR in setting of suspected aortic dissection  VTE Mechanical Prophylaxis: sequential compression device    Invasive lines and devices: Invasive Devices     Peripheral Intravenous Line            Peripheral IV 11/06/17 Right Antecubital 1 day    Peripheral IV 11/07/17 Left Antecubital 1 day                   Counseling / Coordination of Care  Total Critical Care time spent 30 minutes excluding procedures, teaching and family updates         Code Status: Level 1 - Full Code     Portions of the record may have been created with voice recognition software  Occasional wrong word or "sound a like" substitutions may have occurred due to the inherent limitations of voice recognition software  Read the chart carefully and recognize, using context, where substitutions have occurred       Michelle Gilbert DO

## 2017-11-09 RX ORDER — HYDRALAZINE HYDROCHLORIDE 20 MG/ML
5 INJECTION INTRAMUSCULAR; INTRAVENOUS EVERY 6 HOURS PRN
Status: DISCONTINUED | OUTPATIENT
Start: 2017-11-09 | End: 2017-11-11 | Stop reason: HOSPADM

## 2017-11-09 RX ADMIN — LEVOTHYROXINE SODIUM 88 MCG: 88 TABLET ORAL at 05:50

## 2017-11-09 RX ADMIN — ATORVASTATIN CALCIUM 10 MG: 10 TABLET, FILM COATED ORAL at 17:29

## 2017-11-09 RX ADMIN — METOPROLOL TARTRATE 12.5 MG: 25 TABLET ORAL at 20:08

## 2017-11-09 RX ADMIN — METOPROLOL TARTRATE 12.5 MG: 25 TABLET ORAL at 08:49

## 2017-11-09 RX ADMIN — HYDRALAZINE HYDROCHLORIDE 5 MG: 20 INJECTION INTRAMUSCULAR; INTRAVENOUS at 17:54

## 2017-11-09 RX ADMIN — SENNOSIDES 8.6 MG: 8.6 TABLET, FILM COATED ORAL at 21:44

## 2017-11-09 RX ADMIN — DOCUSATE SODIUM 100 MG: 100 CAPSULE, LIQUID FILLED ORAL at 08:49

## 2017-11-09 RX ADMIN — WARFARIN SODIUM 1.5 MG: 1 TABLET ORAL at 18:07

## 2017-11-09 RX ADMIN — AMLODIPINE BESYLATE 10 MG: 10 TABLET ORAL at 08:48

## 2017-11-09 RX ADMIN — OXYCODONE HYDROCHLORIDE 5 MG: 5 TABLET ORAL at 10:10

## 2017-11-09 NOTE — SOCIAL WORK
PT consult placed - CM awaiting recommendations  CM met with pt at bedside to discuss possible STR at d/c  Pt reports that he is agreeable to [facility] if recommended  However, he does not want CM to place referral until after he works with PT  Pt reports he feels confident he will be able to d/c back to independent living  CM to follow

## 2017-11-09 NOTE — PROGRESS NOTES
Patient's BP was 167/78 and rechecked to be 158/77  SLIM was notified and said to give hydralazine PRN despite being ordered for a BP greater than 160

## 2017-11-09 NOTE — PROGRESS NOTES
Progress Note - Critical Care   Creig Mask 80 y o  male MRN: [id number]  Unit/Bed#: MICU 08 Encounter: [id number]    Assessment:  40-year-old male presented for chest pain radiating to the back and found to have a intramural hematoma along the medial wall of the proximal descending aorta suspicious for aortic dissection  Intermural hematoma secondary to aortic dissection, hypertension, Coumadin coagulopathy, paroxysmal atrial fibrillation, hyperlipidemia    Plan:   Patient Active Problem List   Diagnosis    Hyperlipidemia    Hypertension    BPH (benign prostatic hyperplasia)    Coronary artery disease    Acquired hypothyroidism    Status post total right knee replacement    Acute blood loss anemia    Paroxysmal atrial fibrillation (HCC)    Thrombocytopenia ([facility] [address] )    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[facility] [address] )                    Neuro:   -patient is alert and oriented x3    -continue routine neuro checks  -pain control:  Roxicodone p r n  CV:   -chest pain secondary to type B intramural hematoma  Patient states that this is resolved  -cardiothoracic surgery evaluated the patient:  No plans for intervention at this time and continue medical management with antihypertensive therapy  -cardiology consulted and will follow recommendations: 1) maintain heart rate 50-60 2) maintain systolic blood pressure 209-887    -amlodipine 10 mg daily  Hydralazine prn SBP >120  Lopressor 12 5 mg q12hrs; hold for HR <55 and SBP < 110  -history of paroxysmal atrial fibrillation:  Currently rate controlled in Afib  Resume warfarin  -hyperlipidemia:  Continue home Lipitor                 Lung:   -no active pulmonary issues  Stable  -satting 96% on 2 L nasal cannula                 GI:   -no active issues                 FEN:   -regular house diet  -electrolytes within normal limits  -replete electrolytes as needed                 :   -history of BPH: Stable    Patient does not have difficulty with urination  -monitor I/O                 Id:  -No active infectious process  -hold antibiotics                 Heme:   -H&H stable  -no obvious signs of acute bleeding  -continue to monitor, transfuse if hemoglobin less than 7 0                 Endo:   -hypothyroidism:  Continue Synthroid 88 mcg daily                            Msk/Skin:   -Osteoarthritis:  Continue vitamin-D                 Disposition:    Stable for transfer and transfer orders in  Pending physical transfer out of the MICU to med/surg floor    Chief Complaint:  Chest pain radiating to back    HPI/24hr events:      40-year-old male presents from assisted living facility with a history of hypertension, hyperlipidemia, AFib on Coumadin presents for evaluation of sudden-onset substernal chest pain that radiates into his back  Patient states he was playing cards with his friends, had sudden onset of substernal chest pain that caused him to cut himself onto the floor out of his chair  He did not hit his head or lose consciousness  When EMS arrived the patient continued to have chest pain however he described that the pain was now radiating into his back between his shoulder blades and was severe in nature  He denies any abdominal pain any nausea, vomiting, lightheadedness, numbness tingling or weakness  Patient was given 325 mg at pre-hospital, sublingual nitroglycerin and 6 mg of morphine  His heart rate was atrial fibrillation with a rate between 40 and 50 and a blood pressure of 724 systolic pre-hospital   Upon arrival the patient did have chest pain radiating into his back severe nature  Heart rate was in the low 50s, irregular, blood pressure was 110's  Bedside ultrasound did not identify a abdominal aortic aneurysm, IV access was obtained the patient was taken for CT chest tlo evaluate for dissection    No acute events overnight  Patient in rate controlled atrial fibrillation       Physical Exam: Physical Exam   Constitutional: He is oriented to person, place, and time  He appears well-developed and well-nourished  No distress  HENT:   Head: Normocephalic and atraumatic  Right Ear: External ear normal    Left Ear: External ear normal    Mouth/Throat: Oropharynx is clear and moist  No oropharyngeal exudate  Eyes: EOM are normal  Pupils are equal, round, and reactive to light  Right eye exhibits no discharge  Left eye exhibits no discharge  No scleral icterus  Senile arcus noted   Neck: Normal range of motion  Neck supple  No JVD present  No tracheal deviation present  Cardiovascular: Normal rate, normal heart sounds and intact distal pulses  Exam reveals no gallop and no friction rub  No murmur heard  Irregular rhythm   Pulmonary/Chest: Effort normal and breath sounds normal  No respiratory distress  He has no wheezes  He has no rales  He exhibits no tenderness  Abdominal: Soft  Bowel sounds are normal  He exhibits no distension and no mass  There is no tenderness  There is no rebound and no guarding  Musculoskeletal: He exhibits no edema or tenderness  4cm superficial abrasion on right lower extremity  Contusion noted left hand   Neurological: He is alert and oriented to person, place, and time  No cranial nerve deficit or sensory deficit  Skin: Skin is warm and dry  Capillary refill takes less than 2 seconds  He is not diaphoretic  No erythema  No pallor  Psychiatric: He has a normal mood and affect   His behavior is normal          Vitals:    17 2032 17 2331 17 0400 17 0720   BP: 130/59 121/55 154/72 151/69   Pulse: (!) 40 (!) 42 82 79   Resp: (!) 24 20 (!) 23 22   Temp: 97 6 °F (36 4 °C) 98 3 °F (36 8 °C) 98 4 °F (36 9 °C) 98 6 °F (37 °C)   TempSrc: Oral Oral Oral Oral   SpO2: 95% 93% 93% 96%   Weight:       Height:                 Temperature:   Temp (24hrs), Av 2 °F (36 8 °C), Min:97 6 °F (36 4 °C), Max:98 6 °F (37 °C)    Current: Temperature: 98 6 °F (37 °C)    Weights:   IBW: 73 kg Body mass index is 25 59 kg/m²  Weight (last 2 days)     Date/Time   Weight    11/07/17 0100  80 9 (178 35)              Hemodynamic Monitoring:  N/A     Non-Invasive/Invasive Ventilation Settings:  Respiratory    Lab Data (Last 4 hours)    None         O2/Vent Data (Last 4 hours)    None              No results found for: PHART, JBQ4VYY, PO2ART, IBW5BTE, X1JVCSBV, BEART, SOURCE  SpO2: SpO2: 96 %, SpO2 Activity: SpO2 Activity: At Rest, SpO2 Device: O2 Device: Nasal cannula, Capnography: Capnography: No    Intake and Outputs:  I/O       11/05 0701 - 11/06 0700 11/06 0701 - 11/07 0700 11/07 0701 - 11/08 0700    I V  (mL/kg)  181 7 (2 2)     IV Piggyback  1000     Total Intake(mL/kg)  1181 7 (14 6)     Urine (mL/kg/hr)  250     Stool  0     Total Output   250      Net   +931 7             Unmeasured Stool Occurrence  0 x         UOP:  625 mL/24 hour   Nutrition:        Diet Orders            Start     Ordered    11/07/17 0334  Diet Regular; Regular House  Diet effective now     Question Answer Comment   Diet Type Regular    Regular Regular House    RD to adjust diet per protocol?  Yes        11/07/17 0334            Labs:     Results from last 7 days  Lab Units 11/07/17 0439 11/06/17 2203 11/06/17 2202   WBC Thousand/uL 9 86  --  8 39   HEMOGLOBIN g/dL 16 6  --  16 2   I STAT HEMOGLOBIN g/dl  --  16 3  --    HEMATOCRIT % 48 0  --  47 0   PLATELETS Thousands/uL 137*  --  147*   NEUTROS PCT % 85*  --  48   MONOS PCT % 5  --  11        Results from last 7 days  Lab Units 11/08/17  0708 11/07/17  0439 11/06/17 2203 11/06/17 2202   SODIUM mmol/L 142 141  --  138   POTASSIUM mmol/L 4 0 3 7  --  5 8*   CHLORIDE mmol/L 109* 107  --  110*   CO2 mmol/L 23 24  --  22   BUN mg/dL 27* 18  --  21   CREATININE mg/dL 1 14 0 90  --  0 88   CALCIUM mg/dL 8 6 9 0  --  8 7   TOTAL PROTEIN g/dL  --   --   --  6 8   BILIRUBIN TOTAL mg/dL  --   --   --  0 64   ALK PHOS U/L  --   --   --  65   ALT U/L  --   --   --  25   AST U/L  -- --   --  40   GLUCOSE RANDOM mg/dL 146* 162*  --  101   GLUCOSE, ISTAT mg/dl  --   --  107  --        Results from last 7 days  Lab Units 11/07/17  0439   MAGNESIUM mg/dL 2 2            Results from last 7 days  Lab Units 11/08/17  1058 11/07/17  0439 11/06/17  2202   INR  1 60* 2 59* 2 56*   PTT seconds  --   --  42*           0  Lab Value Date/Time   TROPONINI <0 02 08/03/2017 1107       Imaging:  I have personally reviewed pertinent reports  EKG:  Atrial flutter  rate controlled    Micro:  Lab Results   Component Value Date    URINECX 5928-0338 cfu/ml Gram Positive Cocci 06/13/2017       Allergies: Allergies   Allergen Reactions    Amoxicillin     Penicillins Rash       Medications:   Scheduled Meds:    amLODIPine 10 mg Oral Daily   atorvastatin 10 mg Oral After Dinner   docusate sodium 100 mg Oral Daily   levothyroxine 88 mcg Oral Early Morning   metoprolol tartrate 12 5 mg Oral Q12H RUBI   senna 1 tablet Oral HS   warfarin 1 5 mg Oral Once per day on Sun Tue Wed Thu Fri Sat   [START ON 11/13/2017] warfarin 3 mg Oral Once per day on Mon     Continuous Infusions:     PRN Meds:    hydrALAZINE 5 mg Q6H PRN   oxyCODONE 5 mg Q4H PRN       VTE Pharmacologic Prophylaxis: Reason for no pharmacologic prophylaxis Elevated INR in setting of suspected aortic dissection  VTE Mechanical Prophylaxis: sequential compression device    Invasive lines and devices: Invasive Devices     Peripheral Intravenous Line            Peripheral IV 11/08/17 Right Arm 1 day                   Counseling / Coordination of Care  Total Critical Care time spent 30 minutes excluding procedures, teaching and family updates  Code Status: Level 1 - Full Code     Portions of the record may have been created with voice recognition software  Occasional wrong word or "sound a like" substitutions may have occurred due to the inherent limitations of voice recognition software    Read the chart carefully and recognize, using context, where substitutions have occurred       Baird Schirmer, DO

## 2017-11-10 PROBLEM — I48.20 CHRONIC ATRIAL FIBRILLATION (HCC): Status: ACTIVE | Noted: 2017-11-10

## 2017-11-10 PROCEDURE — G8979 MOBILITY GOAL STATUS: HCPCS

## 2017-11-10 PROCEDURE — G8978 MOBILITY CURRENT STATUS: HCPCS

## 2017-11-10 PROCEDURE — 97163 PT EVAL HIGH COMPLEX 45 MIN: CPT

## 2017-11-10 RX ADMIN — AMLODIPINE BESYLATE 10 MG: 10 TABLET ORAL at 08:04

## 2017-11-10 RX ADMIN — ATORVASTATIN CALCIUM 10 MG: 10 TABLET, FILM COATED ORAL at 17:09

## 2017-11-10 RX ADMIN — LEVOTHYROXINE SODIUM 88 MCG: 88 TABLET ORAL at 05:06

## 2017-11-10 RX ADMIN — DOCUSATE SODIUM 100 MG: 100 CAPSULE, LIQUID FILLED ORAL at 08:03

## 2017-11-10 RX ADMIN — OXYCODONE HYDROCHLORIDE 5 MG: 5 TABLET ORAL at 20:05

## 2017-11-10 RX ADMIN — HYDRALAZINE HYDROCHLORIDE 5 MG: 20 INJECTION INTRAMUSCULAR; INTRAVENOUS at 04:13

## 2017-11-10 RX ADMIN — METOPROLOL TARTRATE 12.5 MG: 25 TABLET ORAL at 08:04

## 2017-11-10 RX ADMIN — METOPROLOL TARTRATE 12.5 MG: 25 TABLET ORAL at 21:14

## 2017-11-10 RX ADMIN — SENNOSIDES 8.6 MG: 8.6 TABLET, FILM COATED ORAL at 21:15

## 2017-11-10 RX ADMIN — WARFARIN SODIUM 1.5 MG: 1 TABLET ORAL at 17:09

## 2017-11-10 NOTE — PROGRESS NOTES
[address] Internal Medicine Progress Note  Patient: Aida Gallo 80 y o  male   MRN: [id number]  PCP: Clark Kearney MD  Unit/Bed#: [facility] 430-01 Encounter: [id number]  Date Of Visit: 11/10/17    Assessment:    Principal Problem:    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[address] )      Plan:    1  Type B proximal descending intramural aortic hematoma/AAA, EF 70% - per cardiothoracic surgery, continue with medical management-continue beta-blocker  2  Chronic thrombocytopenia-stable monitor  3  Hypertension- BP is acceptable continue with Norvasc and Lopressor  4  Hyperlipidemia-continue with statin  5  Chronic atrial fibrillation-heart rate controlled , continue Lopressor and Coumadin follow on INR  6  BPH  7  Hypothyroidism continue levothyroxine    Awaiting PT OT eval      VTE Pharmacologic Prophylaxis:   Pharmacologic: Warfarin (Coumadin)  Mechanical VTE Prophylaxis in Place: Yes    Patient Centered Rounds: I have performed bedside rounds with nursing staff today  Discussions with Specialists or Other Care Team Provider:     Education and Discussions with Family / Patient:  Patient and his wife    Time Spent for Care: 30 minutes  More than 50% of total time spent on counseling and coordination of care as described above  Current Length of Stay: 3 day(s)    Current Patient Status: Inpatient   Certification Statement: The patient will continue to require additional inpatient hospital stay due to Blood pressure management    Discharge Plan / Estimated Discharge Date:  Awaiting PT OT eval    Code Status: Level 1 - Full Code      Subjective:   Patient seen and examined  ICU stay reviewed  Patient denied chest pain or shortness of breath    Objective:     Vitals:   Temp (24hrs), Av 2 °F (36 8 °C), Min:97 6 °F (36 4 °C), Max:98 8 °F (37 1 °C)    HR:  [54-95] 56  Resp:  [16-38] 16  BP: ()/(50-97) 99/50  SpO2:  [92 %-97 %] 96 %  Body mass index is 25 59 kg/m²       Input and Output Summary (last 24 hours): Intake/Output Summary (Last 24 hours) at 11/10/17 1042  Last data filed at 11/10/17 0948   Gross per 24 hour   Intake              900 ml   Output             1810 ml   Net             -910 ml       Physical Exam:     Physical Exam  Patient is awake alert in no acute distress  Lung clear to auscultation bilateral  Heart positive S1-S2 no murmur  Abdomen soft nontender positive bowel sounds  Trace left wrist edema secondary to IV fluid infiltration  Additional Data:     Labs:      Results from last 7 days  Lab Units 11/07/17  0439   WBC Thousand/uL 9 86   HEMOGLOBIN g/dL 16 6   HEMATOCRIT % 48 0   PLATELETS Thousands/uL 137*   NEUTROS PCT % 85*   LYMPHS PCT % 10*   MONOS PCT % 5   EOS PCT % 0       Results from last 7 days  Lab Units 11/08/17  0708  11/06/17  2202   SODIUM mmol/L 142  < > 138   POTASSIUM mmol/L 4 0  < > 5 8*   CHLORIDE mmol/L 109*  < > 110*   CO2 mmol/L 23  < > 22   BUN mg/dL 27*  < > 21   CREATININE mg/dL 1 14  < > 0 88   CALCIUM mg/dL 8 6  < > 8 7   TOTAL PROTEIN g/dL  --   --  6 8   BILIRUBIN TOTAL mg/dL  --   --  0 64   ALK PHOS U/L  --   --  65   ALT U/L  --   --  25   AST U/L  --   --  40   GLUCOSE RANDOM mg/dL 146*  < > 101   GLUCOSE, ISTAT   --   < >  --    < > = values in this interval not displayed  Results from last 7 days  Lab Units 11/08/17  1058   INR  1 60*       * I Have Reviewed All Lab Data Listed Above  * Additional Pertinent Lab Tests Reviewed:  [address] Admission Reviewed    Imaging:    Imaging Reports Reviewed Today Include:   Imaging Personally Reviewed by Myself Includes:      Recent Cultures (last 7 days):           Last 24 Hours Medication List:     amLODIPine 10 mg Oral Daily   atorvastatin 10 mg Oral After Dinner   docusate sodium 100 mg Oral Daily   levothyroxine 88 mcg Oral Early Morning   metoprolol tartrate 12 5 mg Oral Q12H [address]   senna 1 tablet Oral HS   warfarin 1 5 mg Oral Once per day on Sun Tue Wed Thu Fri Sat   [START ON 11/13/2017] warfarin 3 mg Oral Once per day on Mon        Today, Patient Was Seen By: Camila Cedeño DO    ** Please Note: This note has been constructed using a voice recognition system   **

## 2017-11-10 NOTE — CASE MANAGEMENT
Continued Stay Review    Date:11/10/2017    Vital Signs: BP 99/50   Pulse 56   Temp 98 1 °F (36 7 °C) (Oral)   Resp 16   Ht 5' 10" (1 778 m)   Wt 80 9 kg (178 lb 5 6 oz)   SpO2 96%   BMI 25 59 kg/m²     Medications:   Scheduled Meds:   amLODIPine 10 mg Oral Daily   atorvastatin 10 mg Oral After Dinner   docusate sodium 100 mg Oral Daily   levothyroxine 88 mcg Oral Early Morning   metoprolol tartrate 12 5 mg Oral Q12H RUBI   senna 1 tablet Oral HS   warfarin 1 5 mg Oral Once per day on Sun Tue Wed Thu Fri Sat   [START ON 11/13/2017] warfarin 3 mg Oral Once per day on Mon     Continuous Infusions:    PRN Meds: hydrALAZINE    oxyCODONE    Abnormal Labs/Diagnostic Results:     Age/Sex: 80 y o  male     Assessment/Plan:   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HCC)        Plan:     1  Type B proximal descending intramural aortic hematoma/AAA, EF 70% - per cardiothoracic surgery, continue with medical management-continue beta-blocker  2  Chronic thrombocytopenia-stable monitor  3  Hypertension- BP is acceptable continue with Norvasc and Lopressor  4  Hyperlipidemia-continue with statin  5  Chronic atrial fibrillation-heart rate controlled , continue Lopressor and Coumadin follow on INR  6  BPH  7  Hypothyroidism continue levothyroxine     Awaiting PT OT eval        VTE Pharmacologic Prophylaxis:   Pharmacologic: Warfarin (Coumadin)  Mechanical VTE Prophylaxis in Place: Yes     Patient Centered Rounds: I have performed bedside rounds with nursing staff today      Discussions with Specialists or Other Care Team Provider:      Education and Discussions with Family / Patient:  Patient and his wife     Time Spent for Care: 30 minutes    More than 50% of total time spent on counseling and coordination of care as described above      Current Length of Stay: 3 day(s)     Current Patient Status: Inpatient   Certification Statement: The patient will continue to require additional inpatient hospital stay due to Blood pressure management     Discharge Plan / Estimated Discharge Date:  Awaiting PT LARRY sterling    11/10/2017   PT:  PT - OK to Discharge Yes  (RETURN TO [facility]   LIVING WHEN MED ADAN )

## 2017-11-10 NOTE — PHYSICAL THERAPY NOTE
Physical Therapy Evaluation    Patient's Name: Nagi Huitron    Admitting Diagnosis  Chest pain [R07 9]  Descending thoracic aortic dissection (HCC) [I71 01]    Problem List  Patient Active Problem List   Diagnosis    Hyperlipidemia    Hypertension    BPH (benign prostatic hyperplasia)    Coronary artery disease    Acquired hypothyroidism    Status post total right knee replacement    Acute blood loss anemia    Paroxysmal atrial fibrillation (HCC)    Thrombocytopenia (HCC)    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HCC)       Past Medical History  Past Medical History:   Diagnosis Date    AAA (abdominal aortic aneurysm) (HCC)     AI (aortic insufficiency)     Arthritis     OSTEO    Atrial fibrillation (HCC)     BPH (benign prostatic hyperplasia)     Coronary artery disease     Disease of thyroid gland     HYPO    Hyperlipidemia     Hyperlipidemia     Hypertension        Past Surgical History  Past Surgical History:   Procedure Laterality Date    CAROTID ENDARTARECTOMY      CAROTID ENDARTERECTOMY Left     HERNIA REPAIR      UT TOTAL KNEE ARTHROPLASTY Right 10/19/2016    Procedure: TOTAL KNEE ARTHROPLASTY ;  Surgeon: Evalyn Collet, MD;  Location: [facility];  Service: Orthopedics        11/10/17 1100   Note Type   Note type Eval only   Pain Assessment   Pain Assessment No/denies pain   Pain Score No Pain   Home Living   Type of Home Apartment   Home Layout One level;Elevator   Additional Comments INDEPENDENT LIVING    Prior Function   Level of [place] Independent with ADLs and functional mobility   Lives With Spouse   ADL Assistance Independent   IADLs Independent   Falls in the last 6 months 1 to 4  (1)   Vocational Retired   Restrictions/Precautions   [place] Precautions Per Order (DENIES)   Braces or Orthoses (NONE)   Other Precautions Fall Risk;O2;Telemetry;Multiple lines   General   Family/Caregiver Present No   Cognition   Arousal/Participation Cooperative   Orientation Level Oriented X4   RUE Assessment   RUE Assessment WFL   LUE Assessment   LUE Assessment WFL   RLE Assessment   RLE Assessment WFL  (4-/5 (GROSSLY))   LLE Assessment   LLE Assessment WFL  (4-/5 (GROSSLY))   Bed Mobility   Supine to Sit Unable to assess   Sit to Supine Unable to assess   Transfers   Sit to Stand 5  Supervision   Additional items Increased time required   Stand to Sit 5  Supervision   Ambulation/Elevation   Gait pattern Excessively slow; Shuffling;Decreased foot clearance   Gait Assistance 5  Supervision   Assistive Device None   Distance 5 FEET X2   Balance   Static Sitting Good   Static Standing Fair   Ambulatory Fair   Endurance Deficit   Endurance Deficit Yes   Endurance Deficit Description MED STATUS LIMITATIONS- ELEVATED BP; USE OF O2 (2 L)    Activity Tolerance   Activity Tolerance Treatment limited secondary to medical complications (Comment)  (ELEVATED BP)   Nurse Made Aware ADAN TO SEE PER LAURA SALTER (TARGET SYSTOLIC BP <201; AWARE CURRENT )   Assessment   Prognosis Good   Problem List Decreased strength;Decreased endurance;Decreased mobility   Assessment PT COMPLETED EVALUATION OF 80YEAR OLD MALE ADMITTED WITH ACUTE ONSET OF CHEST PAIN RADIATING TO BACK WHILE PLAYING CARDS  DIAGNOSIS INCLUDES "INTRAMURAL HEMATOMA OF MEDIAL WALL OF PROXIMAL DESCENDING AORTA SUSPICIOUS FOR AORTIC DISSECTION"  PATIENT INTIALLY ADMITTED TO MEDICAL ICU, NOW ON MEDICAL SURGICAL FLOOR  PER RN GUIDELINE FOR SYSTOLIC BP IS <355  UPON ARRIVAL PATIENT'S /79 LIMITING MOBILITY ASSESSMENT  CURRENT MEDICAL AND PHYSICAL INSTABILIES INCLUDE UTILIZATION OF SUPPLEMENTAL O2 (96% ON 2 L AT REST), ELEVATED BP, FALLS RISK, CHAIR ALARM, AND A REGRESSION IN FUNCTIONAL STATUS FROM BASELINE  PMH IS SIGNIFICANT FOR HTN, CAD, AAA, BPH, AND R TKA (10/19/17)  PRIOR TO THIS ADMISSION PATIENT RESIDED WITH SPOUSE IN 3RD FLOOR APARTMENT (ELEVATOR ACCESS) IN [facility] FACILITY WHERE HE REPORTS PRIOR I WITH MOBILITY (NO AD, 1 FALL), ADLS, AND IADLS  + DRIVING  CURRENT IMPAIRMENTS INCLUDE REDUCED ACTIVITY TOLERANCE, B/L L E STRENGTH, AND BALANCE LIMTING SAFE PARTICIPATION IN AMBULATION  DURING PT EVALUATION PATIENT REQUIRED S W/ INCREASED TIME FOR SIT<-->STAND TRANSFERS AND SHORT DISTANCE AMBULATION  HE AMBULATED 5 FEET X 2 W/O AD PRESENTING WITH REDUCED GAIT SPEED  POST AMBULATION /82  ANTICIPATE THIS PATIENT TO BE FUNCTIONING NEAR HIS REPORTED BASELINE AND RECOMMEND D/C TO [facility]IV[facility]  WILL R/O HOME PT SERVICES ([address])  HE WILL BENEFIT FROM CONTINUED SKILLED INPT PT THIS ADMISSION TO IMPROVE MOBILITY AND ACHIEVE MAXIMAL FUNCTION AND SAFETY  Goals   Patient Goals TO GO BACK TO [facility]  LIVING   LTG Expiration Date 11/20/17   Long Term Goal #1 7-10 DAYS: 1) COMPELTE BED MOBILITY MOD-I; 2) PERFORM SIT<-->STAND TRANSFER MOD-I; 3) AMBULATE 300 FEET I/MOD-I; 4) IMPROVE B/L LE STRENGTH BY 1/2 GRADE; 5) IMPROVE BALANCE BY 1 GRADE   Treatment Day 0   Plan   Treatment/Interventions Functional transfer training;LE strengthening/ROM; Therapeutic exercise;Gait training;Bed mobility; Equipment eval/education   PT Frequency 5x/wk   Recommendation   Recommendation Home with family support  (RETURN TO [facility]  LIVING )   Equipment Recommended (NONE AT THIS TIME )   PT - OK to Discharge Yes  (RETURN TO [facility]   LIVING WHEN MED ADAN )   Barthel Index   Feeding 10   Bathing 0   Grooming Score 5   Dressing Score 10   Bladder Score 10   Bowels Score 10   Toilet Use Score 10   Transfers (Bed/Chair) Score 10   Mobility (Level Surface) Score 0   Stairs Score 0   Barthel Index Score 65           Alec Mathis, PT

## 2017-11-10 NOTE — PLAN OF CARE
Problem: PHYSICAL THERAPY ADULT  Goal: Performs mobility at highest level of function for planned discharge setting  See evaluation for individualized goals  Treatment/Interventions: Functional transfer training, LE strengthening/ROM, Therapeutic exercise, Gait training, Bed mobility, Equipment eval/education  Equipment Recommended:  (NONE AT THIS TIME )       See flowsheet documentation for full assessment, interventions and recommendations  Kirill Gonzalez PT    Prognosis: Good  Problem List: Decreased strength, Decreased endurance, Decreased mobility  Assessment: PT COMPLETED EVALUATION OF 80YEAR OLD MALE ADMITTED WITH ACUTE ONSET OF CHEST PAIN RADIATING TO BACK WHILE PLAYING CARDS  DIAGNOSIS INCLUDES "INTRAMURAL HEMATOMA OF MEDIAL WALL OF PROXIMAL DESCENDING AORTA SUSPICIOUS FOR AORTIC DISSECTION"  PATIENT INTIALLY ADMITTED TO MEDICAL ICU, NOW ON MEDICAL SURGICAL FLOOR  PER RN GUIDELINE FOR SYSTOLIC BP IS <490  UPON ARRIVAL PATIENT'S /79 LIMITING MOBILITY ASSESSMENT  CURRENT MEDICAL AND PHYSICAL INSTABILIES INCLUDE UTILIZATION OF SUPPLEMENTAL O2 (96% ON 2 L AT REST), ELEVATED BP, FALLS RISK, CHAIR ALARM, AND A REGRESSION IN FUNCTIONAL STATUS FROM BASELINE  PMH IS SIGNIFICANT FOR HTN, CAD, AAA, BPH, AND R TKA (10/19/17)  PRIOR TO THIS ADMISSION PATIENT RESIDED WITH SPOUSE IN 3RD FLOOR APARTMENT (ELEVATOR ACCESS) IN [email or web address] WHERE HE REPORTS PRIOR I WITH MOBILITY (NO AD, 1 FALL), ADLS, AND IADLS  + DRIVING  CURRENT IMPAIRMENTS INCLUDE REDUCED ACTIVITY TOLERANCE, B/L L E STRENGTH, AND BALANCE LIMTING SAFE PARTICIPATION IN AMBULATION  DURING PT EVALUATION PATIENT REQUIRED S W/ INCREASED TIME FOR SIT<-->STAND TRANSFERS AND SHORT DISTANCE AMBULATION  HE AMBULATED 5 FEET X 2 W/O AD PRESENTING WITH REDUCED GAIT SPEED  POST AMBULATION /82  ANTICIPATE THIS PATIENT TO BE FUNCTIONING NEAR HIS REPORTED BASELINE AND RECOMMEND D/C TO [facility]  LIVING FACILITY   WILL R/O HOME PT SERVICES (PATIENT [address])  HE WILL BENEFIT FROM CONTINUED SKILLED INPT PT THIS ADMISSION TO IMPROVE MOBILITY AND ACHIEVE MAXIMAL FUNCTION AND SAFETY  Recommendation: (S) Home with family support (RETURN TO IND  LIVING )     PT - OK to Discharge: (S) Yes (RETURN TO IND  LIVING WHEN MED ADAN )    See flowsheet documentation for full assessment     Alec Mathis, PT

## 2017-11-11 VITALS
WEIGHT: 178.35 LBS | HEIGHT: 70 IN | RESPIRATION RATE: 18 BRPM | DIASTOLIC BLOOD PRESSURE: 59 MMHG | TEMPERATURE: 98.5 F | SYSTOLIC BLOOD PRESSURE: 132 MMHG | BODY MASS INDEX: 25.53 KG/M2 | OXYGEN SATURATION: 96 % | HEART RATE: 77 BPM

## 2017-11-11 LAB
ATRIAL RATE: 86 BPM
INR PPP: 1.99 (ref 0.86–1.16)
P AXIS: 62 DEGREES
PR INTERVAL: 152 MS
PROTHROMBIN TIME: 22.8 SECONDS (ref 12.1–14.4)
QRS AXIS: 49 DEGREES
QRSD INTERVAL: 98 MS
QT INTERVAL: 362 MS
QTC INTERVAL: 433 MS
T WAVE AXIS: 59 DEGREES
VENTRICULAR RATE: 86 BPM

## 2017-11-11 PROCEDURE — G8989 SELF CARE D/C STATUS: HCPCS

## 2017-11-11 PROCEDURE — 93005 ELECTROCARDIOGRAM TRACING: CPT

## 2017-11-11 PROCEDURE — 85610 PROTHROMBIN TIME: CPT | Performed by: INTERNAL MEDICINE

## 2017-11-11 PROCEDURE — 97166 OT EVAL MOD COMPLEX 45 MIN: CPT

## 2017-11-11 PROCEDURE — G8987 SELF CARE CURRENT STATUS: HCPCS

## 2017-11-11 PROCEDURE — G8988 SELF CARE GOAL STATUS: HCPCS

## 2017-11-11 RX ORDER — POTASSIUM CHLORIDE 20 MEQ/1
40 TABLET, EXTENDED RELEASE ORAL DAILY
Refills: 0
Start: 2017-11-11

## 2017-11-11 RX ORDER — AMLODIPINE BESYLATE 10 MG/1
10 TABLET ORAL DAILY
Qty: 30 TABLET | Refills: 0 | Status: SHIPPED | OUTPATIENT
Start: 2017-11-11

## 2017-11-11 RX ADMIN — METOPROLOL TARTRATE 12.5 MG: 25 TABLET ORAL at 09:57

## 2017-11-11 RX ADMIN — AMLODIPINE BESYLATE 10 MG: 10 TABLET ORAL at 09:58

## 2017-11-11 RX ADMIN — LEVOTHYROXINE SODIUM 88 MCG: 88 TABLET ORAL at 05:29

## 2017-11-11 NOTE — PLAN OF CARE
Problem: OCCUPATIONAL THERAPY ADULT  Goal: Performs self-care activities at highest level of function for planned discharge setting  See evaluation for individualized goals  See flowsheet documentation for full assessment, interventions and recommendations  Prognosis: Good  Assessment: Pt is a 81 y/o male admitted with aortic hematoma and chest pain  Pt reports independence with all ADL's and mobility  Pt stated he has had 1 fall in the last month 2/2 to syncopal episode  Pt lives in apt 1 level with elevator  Pt has SPC and RW at home but does not use  At this time pt is independent with all ADL's and mobility with no Ad  Pt currently has no skilled OT needs at this time will d/c Ot  Please re-consult OT if pt status changes  Recommend d/c to return home to apartment when medically cleared with possible home PT services for gait/balance  Ended session with pt in [address] [place] [address] chair with all items in reach  OT Discharge Recommendation: Home independent  OT - OK to Discharge:  Yes

## 2017-11-11 NOTE — SOCIAL WORK
CM informed that patient is medically stable for d/c to [facility]  CM arranged transport through [facility] for 1:30 pm p/u  RN Hanny Flores notified and provided envelope and completed CMM  CM spoke with family and patient at bedside regarding transport plan  Family agreeable  Jana at [facility] informed of transport time

## 2017-11-11 NOTE — PROGRESS NOTES
Pt noted to have frequent PVCs (> 15 per minute)  Paged [facility], Dr Maricarmen Jonas made aware  No new orders at this time  Will continue to monitor

## 2017-11-11 NOTE — DISCHARGE SUMMARY
Discharge Summary - [facility]    Patient Information: Amy Dubon 80 y o  male MRN: [id number]  Unit/Bed#: [facility] 430-01 Encounter: [id number]    Discharging Physician / Practitioner: Stephie Parikh DO  PCP: Anabel Shaikh MD  Admission Date: 11/6/2017  Discharge Date: 11/11/17    Reason for Admission:   Type B proximal descending intramural aortic hematoma    Discharge Diagnoses:     Principal Problem:    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[facility] )  Active Problems:    Hypertension    BPH (benign prostatic hyperplasia)    Acquired hypothyroidism    Thrombocytopenia ([facility] )    Chronic atrial fibrillation ([facility] )  Resolved Problems:    * No resolved hospital problems  *      Consultations During Hospital Stay:  · Cardiothoracic surgery    Procedures Performed:   CTA of the chest and abdomen  High density along the medial wall of the proximal descending aorta suspicious for intramural hematoma  Consultation with thoracic surgery is recommended  There is no pericardial effusion  No inflammatory stranding in the mesenteric fat  Trace right   pleural effusion  Cardiomegaly without pericardial effusion  Atherosclerotic coronary artery calcifications are seen  2 8 cm cystic lesion in the pancreatic head/uncinate process      Cardiac echo with EF 70%    Significant Findings / Test Results:     As above  Incidental Findings:   · none    Test Results Pending at Discharge (will require follow up):   · none     Outpatient Tests Requested:  · none    Complications:  none    Hospital Course:     Amy Dubon is a 80 y o  male patient who originally presented to the hospital on 11/6/2017 due to chest pain  Patient presents from assisted living facility with a history of hypertension, hyperlipidemia, AFib on Coumadin presents for evaluation of sudden-onset substernal chest pain that radiates into his back   Patient states he was playing cards with his friends, had sudden onset of substernal chest pain that caused him to fall out of his chair    Patient was given 325 mg aspirin at pre-hospital, sublingual nitroglycerin and 6 mg of morphine   His heart rate was atrial fibrillation with a rate between 40 and 50 and a blood pressure of 729 systolic pre-hospital   Upon arrival the patient did have chest pain radiating into his back severe nature   Bedside ultrasound did not identify a abdominal aortic aneurysm,  CT findings showed a intramural hematoma at the proximal descending aorta; suspicious for aortic dissection  Patient was transferred to the MICU with a Cardene gtt for hemodynamic monitoring, cardiac surgery consulted  patient did not need any intervention and recommended medical management with antihypertensive therapy  With the course of his MICU stay the patient was weaned off his Cardene gtt  His amlodipine dose was increased from 5 mg daily to 10 mg daily   Patient was started on Lopressor 12 5 mg q 12 hour    Currently patient is stable condition no further pain he will be discharged back to [facility],   to follow with his family doctor in 1 week     Condition at Discharge: fair     Discharge Day Visit / Exam:     Subjective:    Patient seen and examined  Comfortable in bed  Anxious to go home  Denied chest pain or shortness of breath  Vitals: Blood Pressure: 128/74 (11/11/17 0957)  Pulse: 72 (11/11/17 0957)  Temperature: 98 8 °F (37 1 °C) (11/11/17 0747)  Temp Source: Oral (11/11/17 0747)  Respirations: 20 (11/11/17 0747)  Height: 5' 10" (177 8 cm) (11/07/17 1516)  Weight - Scale: 80 9 kg (178 lb 5 6 oz) (11/07/17 0100)  SpO2: 97 % (11/11/17 0747)  Exam:   Physical Exam  Patient is awake alert in no acute distress  Lung clear to auscultation bilateral  Heart positive S1-S2 no murmur  Abdomen soft nontender positive bowel sounds  Extremities no edema  Discussion with Family: wife    Discharge instructions/Information to patient and family:   See after visit summary for information provided to patient and family  Provisions for Follow-Up Care:  See after visit summary for information related to follow-up care and any pertinent home health orders  Disposition:     Kyle Larson (see below)    For Discharges to [facility] SNF:   · Daja Call MD -     Planned Readmission: no     Discharge Statement:  I spent 35  minutes discharging the patient  This time was spent on the day of discharge  I had direct contact with the patient on the day of discharge  Greater than 50% of the total time was spent examining patient, answering all patient questions, arranging and discussing plan of care with patient as well as directly providing post-discharge instructions  Additional time then spent on discharge activities  Discharge Medications:  See after visit summary for reconciled discharge medications provided to patient and family        ** Please Note: This note has been constructed using a voice recognition system **

## 2017-11-11 NOTE — PROGRESS NOTES
Pt  With noted ai cardia on monitor after lopressor given (as low as 32bpm)  Pt  Sitting in chair with family at bedside and offered no complaints/no sx  Dr Hansa Parry made aware, no further interventions at this time, pt  Still stable and ready for discharge

## 2017-11-11 NOTE — OCCUPATIONAL THERAPY NOTE
[address]  Evaluation     Patient Name: Denisse Avitia  SRJGE'B Date: 11/11/2017  Problem List  Patient Active Problem List   Diagnosis    Hyperlipidemia    Hypertension    BPH (benign prostatic hyperplasia)    Coronary artery disease    Acquired hypothyroidism    Status post total right knee replacement    Acute blood loss anemia    Paroxysmal atrial fibrillation (HCC)    Thrombocytopenia (HCC)    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HCC)    Chronic atrial fibrillation (HCC)     Past Medical History  Past Medical History:   Diagnosis Date    AAA (abdominal aortic aneurysm) (HCC)     AI (aortic insufficiency)     Arthritis     OSTEO    Atrial fibrillation (HCC)     BPH (benign prostatic hyperplasia)     Coronary artery disease     Disease of thyroid gland     HYPO    Hyperlipidemia     Hyperlipidemia     Hypertension      Past Surgical History  Past Surgical History:   Procedure Laterality Date    CAROTID ENDARTARECTOMY      CAROTID ENDARTERECTOMY Left     HERNIA REPAIR      [place] TOTAL KNEE ARTHROPLASTY Right 10/19/2016    Procedure: TOTAL KNEE ARTHROPLASTY ;  Surgeon: Paris Henry MD;  Location: [facility];  Service: Orthopedics        11/11/17 0945   Note Type   Note type Eval only   Pain Assessment   Pain Score No Pain   Home Living   Type of Home Apartment   Home Layout One level;Performs ADLs on one level;Elevator; Access   Bathroom Shower/Tub Tub/shower unit   Bathroom Toilet Standard   Bathroom Equipment Other (Comment)   Bathroom Accessibility Accessible   Home Equipment Walker;Cane   Prior Function   Level of [place] Independent with ADLs and functional mobility   Lives With Alone   Receives Help From Family   ADL Assistance Independent   IADLs Independent   Falls in the last 6 months 1 to 4   Vocational Retired   Lifestyle   Autonomy pt is independent with all ADL/IADL and mobility   Reciprocal Relationships Pt lives alone but does have family close by   Service to Others retired   Intrinsic Gratification likes reading   Selam Ramsay 19 (WDL) WDL   Subjective   Subjective " They said I might get to leave today"   ADL   Eating Assistance 7  [address] 7  Independent    [facility] 7  [address]  to Stand 7  Independent   Stand to Sit 7  Independent   Functional Mobility   Functional Mobility 6  Modified independent   Additional items (no AD)   Balance   Static Sitting Good   Dynamic Sitting Good   Static Standing Fair +   Dynamic Standing Fair +   Ambulatory Fair  (no AD)   Activity Tolerance   Activity Tolerance Patient tolerated treatment well   RUE Assessment   RUE Assessment WFL   LUE Assessment   LUE Assessment WFL   Cognition   Overall Cognitive Status WFL   Arousal/Participation Alert; Cooperative   Attention Within functional limits   Orientation Level Oriented X4   Memory Within functional limits   Following Commands Follows all commands and directions without difficulty   Assessment   Prognosis Good   Assessment Pt is a 79 y/o male admitted with aortic hematoma and chest pain  Pt reports independence with all ADL's and mobility  Pt stated he has had 1 fall in the last month 2/2 to syncopal episode  Pt lives in apt 1 level with elevator  Pt has SPC and RW at home but does not use  At this time pt is independent with all ADL's and mobility with no Ad  Pt currently has no skilled OT needs at this time will d/c Ot  Please re-consult OT if pt status changes  Recommend d/c to return home to apartment when medically cleared with possible home PT services for gait/balance  Ended session with pt in [address] [place] [address] chair with all items in reach      Goals   Patient Goals to go home today   Plan   OT Frequency Eval only   Recommendation   OT Discharge Recommendation Home independent   OT - OK to Discharge Yes   Barthel Index   Feeding 10   Bathing 5   Grooming Score 5   Dressing Score 10   Bladder Score 10   Bowels Score 10   Toilet Use Score 10   Transfers (Bed/Chair) Score 10   Mobility (Level Surface) Score 15   Stairs Score 5   Barthel Index Score 90   Modified Le[place] Scale   Modified [place] Scale 1      Mana Collier OT  11/11/17

## 2017-11-15 ENCOUNTER — HOSPITAL ENCOUNTER (OUTPATIENT)
Facility: HOSPITAL | Age: 82
Setting detail: OBSERVATION
End: 2017-11-15
Attending: EMERGENCY MEDICINE | Admitting: INTERNAL MEDICINE
Payer: MEDICARE

## 2017-11-15 ENCOUNTER — APPOINTMENT (EMERGENCY)
Dept: RADIOLOGY | Facility: HOSPITAL | Age: 82
End: 2017-11-15
Payer: MEDICARE

## 2017-11-15 VITALS
SYSTOLIC BLOOD PRESSURE: 148 MMHG | BODY MASS INDEX: 24.39 KG/M2 | OXYGEN SATURATION: 96 % | WEIGHT: 170 LBS | TEMPERATURE: 98.6 F | RESPIRATION RATE: 28 BRPM | HEART RATE: 78 BPM | DIASTOLIC BLOOD PRESSURE: 70 MMHG

## 2017-11-15 DIAGNOSIS — I71.1 RUPTURED THORACIC AORTIC ANEURYSM (HCC): Primary | ICD-10-CM

## 2017-11-15 DIAGNOSIS — I71.1 THORACIC ANEURYSM, RUPTURED (HCC): ICD-10-CM

## 2017-11-15 PROBLEM — Z51.5 HOSPICE CARE: Status: ACTIVE | Noted: 2017-11-15

## 2017-11-15 LAB
ALBUMIN SERPL BCP-MCNC: 2.5 G/DL (ref 3.5–5)
ALP SERPL-CCNC: 81 U/L (ref 46–116)
ALT SERPL W P-5'-P-CCNC: 73 U/L (ref 12–78)
ANION GAP BLD CALC-SCNC: 16 MMOL/L (ref 4–13)
ANION GAP SERPL CALCULATED.3IONS-SCNC: 7 MMOL/L (ref 4–13)
APTT PPP: 60 SECONDS (ref 23–35)
AST SERPL W P-5'-P-CCNC: 50 U/L (ref 5–45)
ATRIAL RATE: 117 BPM
ATRIAL RATE: 159 BPM
ATRIAL RATE: 220 BPM
BASOPHILS # BLD MANUAL: 0.09 THOUSAND/UL (ref 0–0.1)
BASOPHILS NFR MAR MANUAL: 1 % (ref 0–1)
BILIRUB SERPL-MCNC: 1.11 MG/DL (ref 0.2–1)
BUN BLD-MCNC: 13 MG/DL (ref 5–25)
BUN SERPL-MCNC: 13 MG/DL (ref 5–25)
BURR CELLS BLD QL SMEAR: PRESENT
CA-I BLD-SCNC: 1.09 MMOL/L (ref 1.12–1.32)
CALCIUM SERPL-MCNC: 8.5 MG/DL (ref 8.3–10.1)
CHLORIDE BLD-SCNC: 104 MMOL/L (ref 100–108)
CHLORIDE SERPL-SCNC: 107 MMOL/L (ref 100–108)
CO2 SERPL-SCNC: 27 MMOL/L (ref 21–32)
CREAT BLD-MCNC: 0.7 MG/DL (ref 0.6–1.3)
CREAT SERPL-MCNC: 0.68 MG/DL (ref 0.6–1.3)
EOSINOPHIL # BLD MANUAL: 0.09 THOUSAND/UL (ref 0–0.4)
EOSINOPHIL NFR BLD MANUAL: 1 % (ref 0–6)
ERYTHROCYTE [DISTWIDTH] IN BLOOD BY AUTOMATED COUNT: 13.5 % (ref 11.6–15.1)
GFR SERPL CREATININE-BSD FRML MDRD: 83 ML/MIN/1.73SQ M
GFR SERPL CREATININE-BSD FRML MDRD: 83 ML/MIN/1.73SQ M
GLUCOSE SERPL-MCNC: 105 MG/DL (ref 65–140)
GLUCOSE SERPL-MCNC: 110 MG/DL (ref 65–140)
HCT VFR BLD AUTO: 40.7 % (ref 36.5–49.3)
HCT VFR BLD CALC: 41 % (ref 36.5–49.3)
HGB BLD-MCNC: 14.2 G/DL (ref 12–17)
HGB BLDA-MCNC: 13.9 G/DL (ref 12–17)
INR PPP: 3.95 (ref 0.86–1.16)
LYMPHOCYTES # BLD AUTO: 1.71 THOUSAND/UL (ref 0.6–4.47)
LYMPHOCYTES # BLD AUTO: 19 % (ref 14–44)
MCH RBC QN AUTO: 30.5 PG (ref 26.8–34.3)
MCHC RBC AUTO-ENTMCNC: 34.9 G/DL (ref 31.4–37.4)
MCV RBC AUTO: 88 FL (ref 82–98)
MONOCYTES # BLD AUTO: 1.26 THOUSAND/UL (ref 0–1.22)
MONOCYTES NFR BLD: 14 % (ref 4–12)
NEUTROPHILS # BLD MANUAL: 5.58 THOUSAND/UL (ref 1.85–7.62)
NEUTS SEG NFR BLD AUTO: 62 % (ref 43–75)
NRBC BLD AUTO-RTO: 0 /100 WBCS
PCO2 BLD: 24 MMOL/L (ref 21–32)
PLATELET # BLD AUTO: 176 THOUSANDS/UL (ref 149–390)
PLATELET BLD QL SMEAR: ADEQUATE
PMV BLD AUTO: 11.3 FL (ref 8.9–12.7)
POIKILOCYTOSIS BLD QL SMEAR: PRESENT
POTASSIUM BLD-SCNC: 3.8 MMOL/L (ref 3.5–5.3)
POTASSIUM SERPL-SCNC: 3.7 MMOL/L (ref 3.5–5.3)
PROT SERPL-MCNC: 6.3 G/DL (ref 6.4–8.2)
PROTHROMBIN TIME: 39.3 SECONDS (ref 12.1–14.4)
QRS AXIS: 34 DEGREES
QRS AXIS: 35 DEGREES
QRS AXIS: 42 DEGREES
QRSD INTERVAL: 84 MS
QT INTERVAL: 354 MS
QT INTERVAL: 354 MS
QT INTERVAL: 362 MS
QTC INTERVAL: 392 MS
QTC INTERVAL: 398 MS
QTC INTERVAL: 440 MS
RBC # BLD AUTO: 4.65 MILLION/UL (ref 3.88–5.62)
RBC MORPH BLD: PRESENT
SODIUM BLD-SCNC: 139 MMOL/L (ref 136–145)
SODIUM SERPL-SCNC: 141 MMOL/L (ref 136–145)
SPECIMEN SOURCE: ABNORMAL
SPECIMEN SOURCE: NORMAL
T WAVE AXIS: 45 DEGREES
T WAVE AXIS: 56 DEGREES
T WAVE AXIS: 63 DEGREES
TOTAL CELLS COUNTED SPEC: 100
TROPONIN I BLD-MCNC: 0 NG/ML (ref 0–0.08)
VARIANT LYMPHS # BLD AUTO: 3 %
VENTRICULAR RATE: 74 BPM
VENTRICULAR RATE: 76 BPM
VENTRICULAR RATE: 89 BPM
WBC # BLD AUTO: 9 THOUSAND/UL (ref 4.31–10.16)

## 2017-11-15 PROCEDURE — 80047 BASIC METABLC PNL IONIZED CA: CPT

## 2017-11-15 PROCEDURE — 85014 HEMATOCRIT: CPT

## 2017-11-15 PROCEDURE — 99285 EMERGENCY DEPT VISIT HI MDM: CPT

## 2017-11-15 PROCEDURE — 93005 ELECTROCARDIOGRAM TRACING: CPT | Performed by: EMERGENCY MEDICINE

## 2017-11-15 PROCEDURE — 85610 PROTHROMBIN TIME: CPT | Performed by: EMERGENCY MEDICINE

## 2017-11-15 PROCEDURE — 85007 BL SMEAR W/DIFF WBC COUNT: CPT | Performed by: EMERGENCY MEDICINE

## 2017-11-15 PROCEDURE — 36415 COLL VENOUS BLD VENIPUNCTURE: CPT | Performed by: EMERGENCY MEDICINE

## 2017-11-15 PROCEDURE — 85027 COMPLETE CBC AUTOMATED: CPT | Performed by: EMERGENCY MEDICINE

## 2017-11-15 PROCEDURE — 74175 CTA ABDOMEN W/CONTRAST: CPT

## 2017-11-15 PROCEDURE — 71275 CT ANGIOGRAPHY CHEST: CPT

## 2017-11-15 PROCEDURE — 80053 COMPREHEN METABOLIC PANEL: CPT | Performed by: EMERGENCY MEDICINE

## 2017-11-15 PROCEDURE — 84484 ASSAY OF TROPONIN QUANT: CPT

## 2017-11-15 PROCEDURE — 85730 THROMBOPLASTIN TIME PARTIAL: CPT | Performed by: EMERGENCY MEDICINE

## 2017-11-15 PROCEDURE — 96360 HYDRATION IV INFUSION INIT: CPT

## 2017-11-15 RX ORDER — LORAZEPAM 2 MG/ML
0.5 INJECTION INTRAMUSCULAR
Status: DISCONTINUED | OUTPATIENT
Start: 2017-11-15 | End: 2017-11-15 | Stop reason: HOSPADM

## 2017-11-15 RX ORDER — ACETAMINOPHEN 325 MG/1
650 TABLET ORAL EVERY 6 HOURS PRN
Status: DISCONTINUED | OUTPATIENT
Start: 2017-11-15 | End: 2017-11-15

## 2017-11-15 RX ORDER — ONDANSETRON 2 MG/ML
4 INJECTION INTRAMUSCULAR; INTRAVENOUS EVERY 6 HOURS PRN
Status: DISCONTINUED | OUTPATIENT
Start: 2017-11-15 | End: 2017-11-15 | Stop reason: HOSPADM

## 2017-11-15 RX ORDER — HALOPERIDOL 5 MG/ML
0.5 INJECTION INTRAMUSCULAR
Status: DISCONTINUED | OUTPATIENT
Start: 2017-11-15 | End: 2017-11-15 | Stop reason: HOSPADM

## 2017-11-15 RX ORDER — LORAZEPAM 2 MG/ML
0.5 INJECTION INTRAMUSCULAR EVERY 4 HOURS PRN
Qty: 1 ML | Refills: 0
Start: 2017-11-15 | End: 2017-11-25

## 2017-11-15 RX ORDER — ACETAMINOPHEN 325 MG/1
975 TABLET ORAL 3 TIMES DAILY
Status: DISCONTINUED | OUTPATIENT
Start: 2017-11-15 | End: 2017-11-15 | Stop reason: HOSPADM

## 2017-11-15 RX ORDER — DOCUSATE SODIUM 100 MG/1
100 CAPSULE, LIQUID FILLED ORAL 2 TIMES DAILY PRN
Status: DISCONTINUED | OUTPATIENT
Start: 2017-11-15 | End: 2017-11-15

## 2017-11-15 RX ORDER — MORPHINE SULFATE 2 MG/ML
2 INJECTION, SOLUTION INTRAMUSCULAR; INTRAVENOUS
Status: DISCONTINUED | OUTPATIENT
Start: 2017-11-15 | End: 2017-11-15 | Stop reason: HOSPADM

## 2017-11-15 RX ORDER — MORPHINE SULFATE 2 MG/ML
2 INJECTION, SOLUTION INTRAMUSCULAR; INTRAVENOUS
Status: DISCONTINUED | OUTPATIENT
Start: 2017-11-15 | End: 2017-11-15

## 2017-11-15 RX ORDER — MAGNESIUM HYDROXIDE/ALUMINUM HYDROXICE/SIMETHICONE 120; 1200; 1200 MG/30ML; MG/30ML; MG/30ML
15 SUSPENSION ORAL EVERY 6 HOURS PRN
Status: DISCONTINUED | OUTPATIENT
Start: 2017-11-15 | End: 2017-11-15 | Stop reason: HOSPADM

## 2017-11-15 RX ORDER — MORPHINE SULFATE 100 MG/5ML
5 SOLUTION ORAL
Status: DISCONTINUED | OUTPATIENT
Start: 2017-11-15 | End: 2017-11-15

## 2017-11-15 RX ORDER — MORPHINE SULFATE 2 MG/ML
2 INJECTION, SOLUTION INTRAMUSCULAR; INTRAVENOUS EVERY 6 HOURS
Status: DISCONTINUED | OUTPATIENT
Start: 2017-11-15 | End: 2017-11-15 | Stop reason: HOSPADM

## 2017-11-15 RX ORDER — MORPHINE SULFATE 2 MG/ML
2 INJECTION, SOLUTION INTRAMUSCULAR; INTRAVENOUS
Qty: 1 SYRINGE | Refills: 0
Start: 2017-11-15 | End: 2017-11-25

## 2017-11-15 RX ADMIN — MORPHINE SULFATE 2 MG: 2 INJECTION, SOLUTION INTRAMUSCULAR; INTRAVENOUS at 09:35

## 2017-11-15 RX ADMIN — SODIUM CHLORIDE 1000 ML: 0.9 INJECTION, SOLUTION INTRAVENOUS at 04:49

## 2017-11-15 RX ADMIN — IOHEXOL 100 ML: 350 INJECTION, SOLUTION INTRAVENOUS at 05:24

## 2017-11-15 NOTE — ED NOTES
Spoke with Janis from hospice  Stated will give info to hospice liaison       Pola De Oliveira, RN  66/90/99 [phone]

## 2017-11-15 NOTE — ED RE-EVALUATION NOTE
Patient to CT scan  Dr Zen Rader went to Radiology to discuss findings given our concern on wet read  Confirmed aortic rupture  Patient informed of diagnosis and poor prognosis  He again states that he does not want any heroic measures  No surgery  He remains DNI DNR  He only wishes to be comfortable at this time

## 2017-11-15 NOTE — HOSPICE NOTE
Met with pt and wife, patient approved for inpatient hospice  Dr Harry Beckford present and assisting  Pt and wife in agreement with transport to [facility], transport arranged by nurse Leny Reyes for Tahmina Godoy will call report to [facility] at [phone]  Consents signed and faxed to [facility]  Wife has originals and liaison has copies

## 2017-11-15 NOTE — DISCHARGE SUMMARY
Discharge Summary - [facility]    Patient Information: Aida Gallo 80 y o  male MRN: [id number]  Unit/Bed#: ED 02 Encounter: [id number]    Discharging Physician / Practitioner: JANIA Lopez  PCP: Anisa Coleman MD  Admission Date: 11/15/2017  Discharge Date: 11/15/17    Reason for Admission:  Chest pain    Discharge Diagnoses:     Principal Problem:    Ruptured thoracic aortic aneurysm [facility])  Active Problems:    Hospice care  Resolved Problems:    * No resolved hospital problems  *      Consultations During Hospital Stay:  Palliative care    Procedures Performed:     · CTA of the chest which showed an intramural aortic hematoma and findings most compatible with aortic rupture  Significant Findings / Test Results:     · Thoracic aortic rupture    Incidental Findings:   · None    Test Results Pending at Discharge (will require follow up): · None     Outpatient Tests Requested:  · None    Complications:  None    Hospital Course:     Aida Gallo is a 80 y o  male patient who originally presented to the hospital on 11/15/2017 due to sharp chest pain  Upon investigation he was found to have a focal area of extravasation of contrast into the proximal descending thoracic aorta with increased soft tissue surrounding the aorta with bilateral pleural effusions  Findings were compatible with aortic rupture  The patient's pain was controlled with morphine  He was admitted under observation however the palliative care team saw him early and has a bed for him at the [facility]  He the patient does not want surgery  Therefore, palliative care and hospice it is most appropriate next step  He is alert and conversive  He is requiring IV morphine for pain  That will be continued discharge  His wife is at bedside      Condition at Discharge: terminal     Discharge Day Visit / Exam:     * Please refer to separate progress note for these details *    Discussion with Family:  Wife at bedside      Discharge instructions/Information to patient and family:   See after visit summary for information provided to patient and family  Provisions for Follow-Up Care:  See after visit summary for information related to follow-up care and any pertinent home health orders  Disposition:     Other: Inpatient hospice House    For Discharges to [facility] SNF:   · Not Applicable to this Patient - Not Applicable to this Patient    Planned Readmission:  Not anticipated    Discharge Statement:  I spent 38 minutes discharging the patient  This time was spent on the day of discharge  I had direct contact with the patient on the day of discharge  Greater than 50% of the total time was spent examining patient, answering all patient questions, arranging and discussing plan of care with patient as well as directly providing post-discharge instructions  Additional time then spent on discharge activities  Discharge Medications:  See after visit summary for reconciled discharge medications provided to patient and family  ** Please Note: This note has been constructed using a voice recognition system   **

## 2017-11-15 NOTE — INCIDENTAL FINDINGS
The following findings require follow up:  Radiographic finding   Finding:  Ruptured thoracic aortic aneurysm   Follow up required:  Surgery which patient does not want so he is going to hospice

## 2017-11-15 NOTE — ED ATTENDING ATTESTATION
La Redmond MD, saw and evaluated the patient  I have discussed the patient with the resident/non-physician practitioner and agree with the resident's/non-physician practitioner's findings, Plan of Care, and MDM as documented in the resident's/non-physician practitioner's note, except where noted  All available labs and Radiology studies were reviewed  At this point I agree with the current assessment done in the Emergency Department  I have conducted an independent evaluation of this patient a history and physical is as follows:      Critical Care Time  CritCare Time    This is an evaluation of a 72-year-old male with past medical history significant for coronary disease and recent admission for aortic dissection with intramural hematoma approximately 1 week ago who returns to the emergency department complaining of chest pain with radiation to his back x1 day  Patient states that he has had persistent pain since discharge from the hospital however the past day his symptoms have intensified  The pain is similar to that experienced approximately 1 week ago and described as chest pressure with a muscular ache and tearing sensation in his back  He states that secondary to the pain he is unable to find a comfortable position  Patient has been treated at his nursing facility with nitroglycerin x3 and then was given repeat dose of nitroglycerin by EMS  He reports that the pain subsided slightly but then returned  He otherwise denies any abdominal pain  No nausea no vomiting  Low lightheadedness or dizziness  No focal numbness weakness or tingling  No radiation of symptoms into his legs  On physical exam patient is an elderly male currently with stable vital signs but does have intermittent PVCs on the monitor  Blood pressure with 875 systolic bilateral   HEENT is normocephalic and atraumatic mildly pale conjunctiva otherwise moist mucous membranes  Neck is supple with full range of motion  Heart is irregularly irregular without appreciable murmurs  Rate currently controlled  Lungs are decreased at bilateral bases but otherwise clear  Abdomen is soft nontender nondistended with positive bowel sounds no rebound no guarding no palpable mass  Trace to +1 bilateral lower extremity edema  No calf tenderness to palpation  Intact distal pulses  Capillary refill less than 2 seconds  Awake oriented x3 and following all commands and conversive  Assessment and plan chest pain with radiation to the back in patient with known thoracic aneurysm  Patient states that he is opting for medical management this time  He notes that he is DNI/DNR  He also states that he does not want resuscitative medications  Given concern of progression of  Dissection however, will obtain stat CT a as well as blood work and EKG  Patient offered but declined pain control  Will closely monitor blood pressure and pending CT results will initiate antihypertensive  Will require admission  Pt noted to have an aortic rupture  Call to CT surgery but pt opting for comfort measures only at this time  Understands that without intervention, his disease has 100% mortality  Discuss with hospice and SLIM    Portions of the record may have been created with voice recognition software  Occasional wrong word or sound-a-like" substitutions may have occurred due to the inherent limitations of voice recognition software  Review chart carefully and recognize, using context, where substitutions have occurred

## 2017-11-15 NOTE — ED NOTES
IV infiltrated at CT with 5ml NSL  Ice pack applied and IV removed  New one to be established        Jesus Rendon RN  11/15/17 0071

## 2017-11-15 NOTE — ED NOTES
Palliative Care doctor at pt bedside  Roberto arranged pt transport to [facility] for 1000       Jalen Reasons, RN  94/49/53 [phone]

## 2017-11-15 NOTE — CASE MANAGEMENT
Initial Clinical Review    Admission: Date/Time/Statement: N/A @ N/A     Orders Placed This Encounter   Procedures    Place in Observation     Standing Status:   Standing     Number of Occurrences:   1     Order Specific Question:   Admitting Physician     Answer:   Val Hagan [[id number]]     Order Specific Question:   Level of Care     Answer:   Med Surg [16]         ED: Date/Time/Mode of Arrival:   ED Arrival Information     Expected Arrival Acuity Means of Arrival Escorted By Service Admission Type    - 11/15/2017 04:13 Emergent [facility] [address] Emergency    Arrival Complaint    Chest Pain          Chief Complaint:   Chief Complaint   Patient presents with    Chest Pain     Per EMS, pt c/o CP for past few days, but worse tonight  Pt has no pain upon arrival to ED after recieving nitro in EMS  History of Illness: True Half is a 80 y o  male who was recently admitted because of same complaints of chest pain and was found to have intramural aortic hematoma of proximal descending aorta suspicious for aortic dissection  The patient was placed in MICU and started on hemodynamic monitoring with Cardene drip  Cardiac surgery was consulted  The patient did not want to have any intervention done  He was therefore sent home after weaning off the Cardene drip  The patient was chest pain-free      However, early this morning, the patient was complaining of chest discomfort which is somewhat similar to prior however was also described as sharp  That said, the patient went to the emergency room to be evaluated where a chest CT showed a ruptured aneurysm  Noted to is that his hemoglobin has dropped from 16 to currently at 14  The patient was therefore advised that he needs emergent repair  However at this point, the patient refuses any further intervention and he just wants to be kept comfortable      Currently, patient is much more comfortable    He still has a little bit of chest discomfort however he is able to tolerated  The patient denies any cough or cold or fever  ED Vital Signs:   ED Triage Vitals [11/15/17 0418]   Temperature Pulse Respirations Blood Pressure SpO2   98 6 °F (37 °C) 84 18 145/80 95 %      Temp Source Heart Rate Source Patient Position - Orthostatic VS BP Location FiO2 (%)   Oral Monitor Lying Left arm --      Pain Score       No Pain        Wt Readings from Last 1 Encounters:   11/15/17 77 1 kg (170 lb)         Abnormal Labs/Diagnostic Test Results:   Anna Pedro MD)    Ref Range & Units 11/15/17 0430   WBC 4 31 - 10 16 Thousand/uL 9 00    RBC 3 88 - 5 62 Million/uL 4 65    Hemoglobin 12 0 - 17 0 g/dL 14 2    Hematocrit 36 5 - 49 3 % 40 7    MCV 82 - 98 fL 88    MCH 26 8 - 34 3 pg 30 5    MCHC 31 4 - 37 4 g/dL 34 9    RDW 11 6 - 15 1 % 13 5    MPV 8 9 - 12 7 fL 11 3    Platelets 770 - 200 Thousands/uL 176    nRBC /100 WBCs 0             Ref Range & Units 11/15/17 0450   SODIUM, I-STAT 136 - 145 mmol/l 139    Potassium, i-STAT 3 5 - 5 3 mmol/L 3 8    Chloride, istat 100 - 108 mmol/L 104    CO2, i-STAT 21 - 32 mmol/L 24    Anion Gap, Istat 4 - 13 mmol/L 16     Calcium, Ionized i-STAT 1 12 - 1 32 mmol/L 1 09     BUN, I-STAT 5 - 25 mg/dl 13    Creatinine, i-STAT 0 6 - 1 3 mg/dl 0 7    eGFR ml/min/1 73sq m 83    Glucose, i-STAT 65 - 140 mg/dl 110    Hct, i-STAT 36 5 - 49 3 % 41    Hgb, i-STAT 12 0 - 17 0 g/dl 13 9    Specimen Type  VENOUS            Troponin 0 00  PT/INR 39 3/3 95    CTA Dissection protocol chest & abdomen - IMPRESSION:  1  Focal area of extravasation of contrast material in the proximal descending thoracic aorta  Increased soft tissue surrounding the aorta with bilateral pleural effusions  Findings most compatible with aortic rupture  2   Fusiform infrarenal abdominal aortic aneurysm, extending into the common iliac arteries bilaterally  3   Cardiomegaly with pulmonary artery hypertension  4   Prostatomegaly            ED Treatment:   Medication Administration from 11/15/2017 0413 to 11/15/2017 1226       Date/Time Order Dose Route Action Action by Comments     11/15/2017 0449 sodium chloride 0 9 % bolus 1,000 mL 1,000 mL Intravenous New Bravo Meza RN      11/15/2017 0524 iohexol (OMNIPAQUE) 350 MG/ML injection (MULTI-DOSE) 100 mL 100 mL Intravenous Given Db Duck           Past Medical/Surgical History:   Past Medical History:   Diagnosis Date    AAA (abdominal aortic aneurysm) ([address] )     AI (aortic insufficiency)     Arthritis     Atrial fibrillation (HCC)     BPH (benign prostatic hyperplasia)     Coronary artery disease     Disease of thyroid gland     Hyperlipidemia     Hyperlipidemia     Hypertension        Admitting Diagnosis: Chest pain [R07 9]    Age/Sex: 80 y o  male    Assessment/Plan:    Plan for the Primary Problem(s):  · Observation, medical surgical floor  · Ruptured thoracic aortic aneurysm  Patient does not want to have anything done and would just like to be placed in hospice and comfort care  That said, the patient will withdrawal outpatient sustaining medications and instead be placed on medications to keep him pain-free and comfortable  Case management consult  Palliative care consult  Patient is currently level four comfort care      Plan for Additional Problem(s):    Other medications for associated problems such as his atrial fibrillation, BPH, coronary artery disease, hyperlipidemia and hypertension at this point are all discontinued as they are not going to change prognosis    Admission Orders:  Scheduled Meds:   acetaminophen 975 mg Oral TID   morphine injection 2 mg Intravenous Q6H     Continuous Infusions:    PRN Meds: aluminum-magnesium hydroxide-simethicone    haloperidol lactate    LORazepam    morphine injection    ondansetron    Nursing orders -  VS q 4 - Up and OOB as tolerated - End of Life care , comfort measures - diet regular     Palliative care consult - Hospice eval to Hospice house at 10:00 am

## 2017-11-15 NOTE — H&P
History and Physical - [facility]    Patient Information: Ricardo Otero 80 y o  male MRN: [id number]  Unit/Bed#: ED 02 Encounter: [id number]  Admitting Physician: Iliana Caruso MD  PCP: Benton Lewis MD  Date of Admission:  11/15/17  [facility] Problem List:     Principal Problem:    Ruptured thoracic aortic aneurysm [facility])  Active Problems:    Hospice care      Plan for the Primary Problem(s):  · Observation, medical surgical floor  · Ruptured thoracic aortic aneurysm  Patient does not want to have anything done and would just like to be placed in hospice and comfort care  That said, the patient will withdrawal outpatient sustaining medications and instead be placed on medications to keep him pain-free and comfortable  Case management consult  Palliative care consult  Patient is currently level four comfort care  Plan for Additional Problem(s):   · Other medications for associated problems such as his atrial fibrillation, BPH, coronary artery disease, hyperlipidemia and hypertension at this point are all discontinued as they are not going to change prognosis  VTE Prophylaxis: Patient has refused VTE prophylaxis  / sequential compression device   Code Status:  Level 4 comfort care  POLST: POLST form is completed POLST prior is CPR but he has changed this to currently, level 4-comfort care  Anticipated Length of Stay:  Patient will be admitted on an Emergency basis with an anticipated length of stay of  less than 2 midnights  Justification for Hospital Stay: Please see detailed plans noted above  Chief Complaint:     Chest pain  of Present Illness:  Ricardo Otero is a 80 y o  male who was recently admitted because of same complaints of chest pain and was found to have intramural aortic hematoma of proximal descending aorta suspicious for aortic dissection  The patient was placed in MICU and started on hemodynamic monitoring with Cardene drip  Cardiac surgery was consulted  The patient did not want to have any intervention done  He was therefore sent home after weaning off the Cardene drip  The patient was chest pain-free  However, early this morning, the patient was complaining of chest discomfort which is somewhat similar to prior however was also described as sharp  That said, the patient went to the emergency room to be evaluated where a chest CT showed a ruptured aneurysm  Noted to is that his hemoglobin has dropped from 16 to currently at 14  The patient was therefore advised that he needs emergent repair  However at this point, the patient refuses any further intervention and he just wants to be kept comfortable  Currently, patient is much more comfortable  He still has a little bit of chest discomfort however he is able to tolerated  The patient denies any cough or cold or fever      Review of Systems:    Constitutional:  Denies fever or chills   Eyes:  Denies change in visual acuity   HENT:  Denies nasal congestion or sore throat   Respiratory:  Denies cough or shortness of breath   Cardiovascular:  Sharp chest pain in the middle of chest     GI:  Denies abdominal pain, nausea, vomiting, bloody stools or diarrhea   :  Denies dysuria   Musculoskeletal:  Denies back pain or joint pain   Integument:  Denies rash   Neurologic:  Denies headache, focal weakness or sensory changes   Endocrine:  Denies polyuria or polydipsia   Lymphatic:  Denies swollen glands   Psychiatric:  Denies depression or anxiety     Past Medical and Surgical History:   Past Medical History:   Diagnosis Date    AAA (abdominal aortic aneurysm) (HCC)     AI (aortic insufficiency)     Arthritis     OSTEO    Atrial fibrillation (HCC)     BPH (benign prostatic hyperplasia)     Coronary artery disease     Disease of thyroid gland     HYPO    Hyperlipidemia     Hyperlipidemia     Hypertension      Past Surgical History:   Procedure Laterality Date    CAROTID ENDARTARECTOMY      CAROTID ENDARTERECTOMY Left     HERNIA REPAIR      [place] TOTAL KNEE ARTHROPLASTY Right 10/19/2016    Procedure: TOTAL KNEE ARTHROPLASTY ;  Surgeon: Clarence Grewal MD;  [facility];  Service: Orthopedics       Meds/Allergies:    (Not in a hospital admission)    Allergies: Allergies   Allergen Reactions    Amoxicillin     Penicillins Rash     History:  Marital Status: /Civil Union   Occupation:  Retired  Patient Pre-hospital Living Situation: [facility]  Patient Pre-hospital Level of Mobility:  Ambulatory  Patient Pre-hospital Diet Restrictions: regular  Substance Use History:   History   Alcohol Use No     History   Smoking Status    Former Smoker    Quit date: 1990   Smokeless Tobacco    Never Used     History   Drug Use No       Family History:  History reviewed  No pertinent family history  Physical Exam:     Vitals:   Blood Pressure: 167/81 (11/15/17 0529)  Pulse: 86 (11/15/17 0529)  Temperature: 98 6 °F (37 °C) (11/15/17 0418)  Temp Source: Oral (11/15/17 0418)  Respirations: 18 (11/15/17 0529)  Weight - Scale: 77 1 kg (170 lb) (11/15/17 0418)  SpO2: 97 % (11/15/17 0529)    Constitutional:  Well developed, well nourished, no acute distress, non-toxic appearance   Eyes:  PERRL, conjunctiva normal   HENT:  Atraumatic, external ears normal, nose normal, oropharynx moist, no pharyngeal exudates  Neck- normal range of motion, no tenderness, supple   Respiratory:  No respiratory distress, normal breath sounds, no rales, no wheezing   Cardiovascular:  Normal rate, normal rhythm, no murmurs, no gallops, no rubs   GI:  Soft, nondistended, normal bowel sounds, nontender, no organomegaly, no mass, no rebound, no guarding   :  No costovertebral angle tenderness   Musculoskeletal:  No edema, no tenderness, no deformities   Back- no tenderness  Integument:  Well hydrated, no rash   Lymphatic:  No lymphadenopathy noted   Neurologic:  Alert &awake, communicative, CN 2-12 normal, normal motor function, normal sensory function, no focal deficits noted   Psychiatric:  Speech and behavior appropriate       Lab Results: I have personally reviewed pertinent reports  Results from last 7 days  Lab Units 11/15/17  0450 11/15/17  0430   WBC Thousand/uL  --  9 00   HEMOGLOBIN g/dL  --  14 2   I STAT HEMOGLOBIN g/dl 13 9  --    HEMATOCRIT %  --  40 7   PLATELETS Thousands/uL  --  176   LYMPHO PCT %  --  19   MONO PCT MAN %  --  14*   EOSINO PCT MANUAL %  --  1       Results from last 7 days  Lab Units 11/15/17  0450 11/15/17  0430   SODIUM mmol/L  --  141   POTASSIUM mmol/L  --  3 7   CHLORIDE mmol/L  --  107   CO2 mmol/L  --  27   BUN mg/dL  --  13   CREATININE mg/dL  --  0 68   CALCIUM mg/dL  --  8 5   TOTAL PROTEIN g/dL  --  6 3*   BILIRUBIN TOTAL mg/dL  --  1 11*   ALK PHOS U/L  --  81   ALT U/L  --  73   AST U/L  --  50*   GLUCOSE RANDOM mg/dL  --  105   GLUCOSE, ISTAT mg/dl 110  --        Results from last 7 days  Lab Units 11/15/17  0430   INR  3 95*       EKG: sinus rhythm with occasional pvc    Imaging: I have personally reviewed pertinent reports  Cta Dissection Protocol Chest And Abdomen    Result Date: 11/6/2017  Narrative: CTA - CHEST AND ABDOMEN  - WITHOUT AND WITH IV CONTRAST INDICATION:  Chest pain into the back  Midsternal pain starting tonight  COMPARISON:  None  TECHNIQUE: CT examination of the chest and abdomen was performed both prior to and after the administration of intravenous contrast   Thin section angiographic arterial phase post contrast technique was used in order to evaluate for aortic dissection  3D reformatted images and volume rendering were performed on an independent workstation  Additionally, reformatted images were created in axial, sagittal, and coronal planes  Radiation dose length product (DLP) for this visit:  1901 59 mGy-cm     This examination, like all CT scans performed in the [facility], was performed utilizing techniques to minimize radiation dose exposure, including the use of iterative reconstruction and automated exposure control  IV Contrast:  100 mL of iohexol (OMNIPAQUE)     Enteric Contrast: Enteric contrast was not administered  FINDINGS: AORTA: There is high dense material identified along the descending thoracic aorta seen best on series 2 image 18 suspicious for intramural hematoma which is a finding suspicious for aortic dissection  There is no dissection flap identified  There is a rind of soft tissue surrounding the descending aorta which likely represents wall thickening and may represent additional intramural thrombus  There is tortuosity of the thoracic aorta  The ascending aorta is normal in caliber  There is no pericardial effusion  No inflammatory stranding surrounding the aorta  Undulating appearance to the abdominal aorta compatible with atherosclerosis with diffuse calcification present and areas of mild widening and stenosis  Right common iliac artery aneurysm noted measuring 2 9 cm  CHEST LUNGS:  Hypoventilatory changes are seen at the lung bases  PLEURA:  Trace right pleural effusion  HEART/PULMONARY ARTERIAL TREE:  The heart is mildly enlarged  There is no pericardial effusion  Atherosclerotic coronary calcifications are noted  MEDIASTINUM AND JOE:  Unremarkable  CHEST WALL AND LOWER NECK:       Normal  ABDOMEN LIVER/BILIARY TREE:  Unremarkable  GALLBLADDER:  There are gallstone(s) within the gallbladder, without pericholecystic inflammatory changes  SPLEEN:  Unremarkable  PANCREAS:  Focus of low-density in the pancreatic head difficult to characterize measuring approximately 2 8 cm  This has fluid signal and is likely a cyst  ADRENAL GLANDS:  Unremarkable  KIDNEYS/URETERS:  Unremarkable  No hydronephrosis  VISUALIZED STOMACH AND BOWEL:  Unremarkable  VISUALIZED ABDOMINOPELVIC CAVITY:  No ascites or free intraperitoneal air  No lymphadenopathy  ABDOMINAL WALL:  Unremarkable   OSSEOUS STRUCTURES:  Exaggeration of thoracic kyphosis  Impression: High density along the medial wall of the proximal descending aorta suspicious for intramural hematoma  Consultation with thoracic surgery is recommended  There is no pericardial effusion  No inflammatory stranding in the mesenteric fat  Trace right pleural effusion  Cardiomegaly without pericardial effusion  Atherosclerotic coronary artery calcifications are seen  2 8 cm cystic lesion in the pancreatic head/uncinate process  ##cfslh I personally discussed this result with Tati Paez on 11/6/2017 11:27 PM  ## Workstation performed: [id number]         ** Please Note: Dragon 360 Dictation voice to text software was used in the creation of this document   **

## 2017-11-15 NOTE — CONSULTS
Consultation - Palliative and Supportive Care   Delmar Brown 80 y o  male [id number]    Patient Active Problem List   Diagnosis    Hyperlipidemia    Hypertension    BPH (benign prostatic hyperplasia)    Coronary artery disease    Acquired hypothyroidism    Status post total right knee replacement    Acute blood loss anemia    Paroxysmal atrial fibrillation (HCC)    Thrombocytopenia (HCC)    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HCC)    Chronic atrial fibrillation (HCC)    Ruptured [address] ([facility] [address] )    Hospice care      Plan:  1  Symptom management - dyspnea, chest pain, exhaustion   - Will order 2mg morphine IV q6hrs shceduled, and q1hr PRN   - offer haldol and lorazepam 0 5mg q1hr ea    2  Goals - CMO   - Pt has already agreed to hospice cares, and is amenable to transfer to hospice House  - Wife present at bedside and agreeable to this plan  Code Status: DNR/DNI - Level 4   Power of :  presumed to be wife by [place]   Advance Directive / Living Will: none   POLST:  none         We appreciate the invitation to be involved in this patient's care  We will transfer cares to [facility]  Please do not hesitate to reach our on call provider through our clinic answering service at  should you have acute symptom control concerns  Kat Llanos MD  Palliative and Supportive Care  Pager: [phone]       IDENTIFICATION:  Inpatient consult to Palliative Care  Consult performed by: Katiuska Pierre ordered by: Shauna Ca        Physician Requesting Consult: Vanesa Bates MD  Reason for Consult / Principal Problem: pain, goals  Hx and PE limited by: none    HISTORY OF PRESENT ILLNESS:       Delmar Brown is a 80 y o  male who presents with chest pain  This fellow is followed in [facility] for the last several days with chest pain    He has fallen with pain once, and just over last night, his symptoms did not respond well to nitro on subsequent doses  He was transferred to hospital, and here, it is discovered that he has a dissecting aorta  His pain is improved with positioning, APAP, and nitro; no opioids have been used  He is able to endorse dyspnea, worsening with any activity or speech  When he is resting, his symptoms are improved  He is clear with me, and his wife is in agreement, that use of hospice for promotion of comfort over all other concerns is his wish  He does not want to use 'heroic' measures, but accepts the diagnosis and prognosis he was given by the ED team   He has a few daughters that would need to be involved, but he trusts his wife to help round up his family and his affairs  He denies any bad reactions to medications previously, aside from his ABx allergies  He has had morphine previously, and this was well-tolerated  No other contributory medical history to his comfort care plan at this time; his PMH is reviewed and accurate  Review of Systems   Unable to perform ROS: acuity of condition       Past Medical History:   Diagnosis Date    AAA (abdominal aortic aneurysm) (HCC)     AI (aortic insufficiency)     Arthritis     OSTEO    Atrial fibrillation (HCC)     BPH (benign prostatic hyperplasia)     Coronary artery disease     Disease of thyroid gland     HYPO    Hyperlipidemia     Hyperlipidemia     Hypertension      Past Surgical History:   Procedure Laterality Date    CAROTID ENDARTARECTOMY      CAROTID ENDARTERECTOMY Left     HERNIA REPAIR      [place] TOTAL KNEE ARTHROPLASTY Right 10/19/2016    Procedure: TOTAL KNEE ARTHROPLASTY ;  Surgeon: Jalen Crockett MD;  [facility];  Service: Orthopedics     Social History     Social History    Marital status: /Civil Union     Spouse name: N/A    Number of children: N/A    Years of education: N/A     Occupational History    Not on file       Social History Main Topics    Smoking status: Former Smoker     Quit date: 200    Smokeless tobacco: Never Used    Alcohol use No    Drug use: No    Sexual activity: Not on file     Other Topics Concern    Not on file     Social History Narrative    No narrative on file     History reviewed  No pertinent family history  MEDICATIONS / ALLERGIES:    current meds:   Current Facility-Administered Medications   Medication Dose Route Frequency    acetaminophen (TYLENOL) tablet 975 mg  975 mg Oral TID    aluminum-magnesium hydroxide-simethicone (MYLANTA) 200-200-20 mg/5 mL oral suspension 15 mL  15 mL Oral Q6H PRN    haloperidol lactate (HALDOL) injection 0 5 mg  0 5 mg Intramuscular Q1H PRN    LORazepam (ATIVAN) 2 mg/mL injection 0 5 mg  0 5 mg Intravenous Q1H PRN    morphine injection 2 mg  2 mg Intravenous Q1H PRN    ondansetron (ZOFRAN) injection 4 mg  4 mg Intravenous Q6H PRN       Allergies   Allergen Reactions    Amoxicillin     Penicillins Rash       OBJECTIVE:    Physical Exam  Physical Exam   Constitutional: He is oriented to person, place, and time  He appears well-developed and well-nourished  No distress  Frail   HENT:   Head: Normocephalic and atraumatic  Right Ear: External ear normal    Left Ear: External ear normal    Eyes: Conjunctivae and EOM are normal  Pupils are equal, round, and reactive to light  Right eye exhibits no discharge  Left eye exhibits no discharge  Neck: No tracheal deviation present  Cardiovascular:   tachy   Pulmonary/Chest: No stridor  No respiratory distress  Effort increased, pursed lip breathing   Abdominal: Soft  He exhibits distension  There is tenderness (deep)  There is no guarding    scaphoid   Musculoskeletal: He exhibits no edema  Neurological: He is alert and oriented to person, place, and time  No cranial nerve deficit  Coordination normal    Skin: Skin is warm and dry  No rash noted  He is not diaphoretic  No erythema  There is pallor  Psychiatric: He has a normal mood and affect   His behavior is normal  Judgment and thought content normal        Lab Results:   I have personally reviewed pertinent labs  , CBC:   Lab Results   Component Value Date    WBC 9 00 11/15/2017    HGB 13 9 11/15/2017    HCT 40 7 11/15/2017    MCV 88 11/15/2017     11/15/2017    MCH 30 5 11/15/2017    MCHC 34 9 11/15/2017    RDW 13 5 11/15/2017    MPV 11 3 11/15/2017    NRBC 0 11/15/2017   , CMP:   Lab Results   Component Value Date     11/15/2017    K 3 7 11/15/2017     11/15/2017    CO2 27 11/15/2017    ANIONGAP 7 11/15/2017    BUN 13 11/15/2017    CREATININE 0 68 11/15/2017    GLUCOSE 110 11/15/2017    CALCIUM 8 5 11/15/2017    AST 50 (H) 11/15/2017    ALT 73 11/15/2017    ALKPHOS 81 11/15/2017    PROT 6 3 (L) 11/15/2017    ALBUMIN 2 5 (L) 11/15/2017    BILITOT 1 11 (H) 11/15/2017    EGFR 83 11/15/2017   renal function acceptable for morhpine    Imaging Studies: reviewed CT reports of dissection of aorta  EKG, Pathology, and Other Studies: none pertinent    Counseling / Coordination of Care  Total floor / unit time spent today 80+ minutes  Greater than 50% of total time was spent with the patient and / or family counseling and / or coordination of care   A description of the counseling / coordination of care: adjustment of parenteral controlled substances for advanced pain and symptoms; chart review; review and assessment of decisional apparatus and capacity, applicable legal codes and extant documents;  Goals/means and coordination of cares with ED MD and hospice liaison

## 2017-11-16 DIAGNOSIS — I48.91 ATRIAL FIBRILLATION (HCC): ICD-10-CM

## 2018-01-09 NOTE — PROGRESS NOTES
Assessment    1  Encounter for preventive health examination (V70 0) (Z00 00)   2  Primary osteoarthritis of right knee (715 16) (M17 11)   3  Physician orders for life-sustaining treatment (POLST) form indicates patient wish for full   code resuscitation status (V49 89) (Z78 9)    Plan  Hypokalemia    · Potassium Chloride Debi ER 20 MEQ Oral Tablet Extended Release; take 4  tablets daily as directed    Discussion/Summary    #1: HM: See the discussion in this note  #2: DJD Knee: No changes, still with pain  Will follow as needed  #3: POLST: Patient had long discussion  In the end, decided to not change status, and follow with Dr Nivia Peraza in 2 weeks  Impression: Subsequent Annual Wellness Visit  Cardiovascular screening and counseling: screening not indicated  Diabetes screening and counseling: screening is current  HIV screening and counseling: the risks and benefits of screening were discussed and the patient declines screening  Immunizations: the risks and benefits of influenza vaccination were discussed with the patient, the patient declines the influenza vaccination, influenza vaccination is recommended annually, Patient reports had , the risks and benefits of pneumococcal vaccination were discussed with the patient, the patient declines the pneumococcal vaccination and Laura ent reports he had this  Advance Directive Planning: not complete, Patient defers responses till sees Dr Nivia Peraza  Chief Complaint  Pt here medicare annual visit      History of Present Illness  Patient seen and examined for his Medicare annual visit  Only complaint is arthritis in right knee which is ongoing  Had several injections in right knee which he feels hasn't helped  The patient is being seen for the subsequent annual wellness visit     Medicare Screening and Risk Factors   Medicare Screening Tests Risk Questions   Abdominal aortic aneurysm risk assessment: tobacco use, over 72years of age and Quit 22 years ago    Osteoporosis risk assessment: , over 48years of age, tobacco use and alcohol use  Drug and Alcohol Use: The patient is a former cigarette smoker and quit smoking 25 years ago  He has smoked for 10 -15 years year(s)  The patient reports frequent alcohol use, drinking 1 drinks per day and Has a glass of wine at dinner  Alcohol concern:   The patient has no concerns about alcohol abuse  He has declined alcohol treatment  He has never used illicit drugs  Diet and Physical Activity: Current diet includes well balanced meals, limited junk food, 1 servings of fruit per day, 2 - 3 servings of vegetables per day, 1 servings of meat per day, 1 - 2 servings of dairy products per day, Several cups of coffee per day and 3 cups of tea per day  He exercises daily  Exercise: walking, strength training, works out at gym 7 hours per week  Functional Ability/Level of Safety: Hearing is Wears hearing aids bilat ears and a hearing aid is used  He reports hearing difficulties  The patient is currently able to do activities of daily living without limitations, able to participate in social activities without limitations and able to drive without limitations  Activities of daily living details: needs help doing housework and Wife does shopping and laundry, but does not need help using the phone, no transportation help needed, no meal preparation help needed, does not need help managing medications and does not need help managing money  Fall risk factors:  polypharmacy, alcohol use and antihypertensive use, but The patient fell 0 times in the past 12 months , no mobility impairment, no antidepressant use, no deconditioning, no postural hypotension, no sedative use, no visual impairment, no urinary incontinence, no cognitive impairment and no previous fall  Advance Directives: Advance directives: living will, durable power of  for health care directives and advance directives     Co-Managers and Medical Equipment/Suppliers: See Patient Care Team   Falls Risk: The patient fell 0 times in the past 12 months  Symptoms Include: no confusion, no lightheadedness, no vertigo, no dizziness, no syncope, no impaired balance, no visual problems, no leg weakness, no recurring falls and no recent fall    The patient currently has no urinary incontinence symptoms  Date of last glaucoma screen was < 1 year  Due for next screening next month      Patient Care Team    Care Team Member Role Specialty Office Number   Ivelisse GUPTA  Referring Geriatrics [phone]   Niesha GUPTA  Cardiology [phone]   IVY FELIX  [facility]  Vascular Surgery [phone]   Christopher Crisostomo MD Specialist Orthopedic Surgery [phone]     Review of Systems    Constitutional: negative  Head and Face: negative  Eyes: red eyes and Bilateral red conjunctiva right>left, but no eye pain, no watery discharge from the eyes, no purulent discharge from the eyes, no itching of the eyes and no blurred vision  ENT: no sore throat, no scratchy throat, no hoarseness, no nasal congestion, no nasal discharge, no sneezing, no earache and no white patches in the mouth    The patient presents with complaints of hearing loss, described as difficulty with high frequency sounds  Cardiovascular: lower extremity edema, but no chest pain, no palpitations, the heart is not racing and no lightheadedness  Respiratory: negative, no shortness of breath, no wheezing, not sleeping upright or with extra pillows, no cough, no dry cough, no productive cough, no clear sputum, no colored sputum and not vomiting blood  Gastrointestinal: negative  Genitourinary: urinary frequency and nocturia, but no dysuria, no urinary urgency, no urinary incontinence, no urinary hesitancy, no hematuria, no pelvic pain and no testicular pain     Musculoskeletal: back pain, joint stiffness and Right knee pain, but no diffuse joint pain, no generalized muscle aches, no joint swelling, no back muscle spasm, no pain in other joints and no limping  Integumentary and Breasts: Seeing derm next month for check up  Neurological: negative  Psychiatric: negative  Endocrine: negative  Hematologic and Lymphatic: a tendency for easy bruising, but no swollen glands, no swollen glands in the neck, no tendency for easy bleeding and no jaundice  No, the patient has not dropped any activities or interests  No, the patient does not feel that their life is empty  No, the patient does not often get bored  No, the patient is not hopeful about the future  Point = 1  No, the patient is not bothered by thoughts in their head  Yes, the patient is in good spirits most of the time  No, the patient is not afraid something bad is going to happen to them  Yes, the patient feels happy most of the time  No, the patient does not often feel helpless  No, the patient murrell not often get restless and fidgety  No, the patient likes to go out and try new things  No, the patient does not worry about the future  No, the patient does not feel that they have more problems with their memory than most    Yes, the patient thinks its wonderful to be alive now  No, the patient does not feel downhearted or blue  No, the patient does not feel worthless the way they are currently  Yes, the patient finds that life is very exciting  No, the patient does not worry a lot about the past    No, it is not hard for the patient to get started on new projects  Yes, the patient feels full of energy  No, the patient does not feel their situation is hopeless  No, the patient does not feel that most people are better off then they are  No, the patient does not frequently get upset about the little things  No, the patient does not frequently feel like crying  No, the patient does not have any problems concentrating  Yes, the patient enjoys getting up in the morning     No, the patient enjoys social gatherings  Yes, the patient finds it easy to make decisions  Yes, the patient feels their mind is as clear as it used to be  Normal 0 - 9, Mild Depression 10 - 19, Severe Depression 20 - 30      Active Problems    1  Abdominal aortic aneurysm (441 4) (I71 4)   2  Aortic regurgitation (424 1) (I35 1)   3  Atrial fibrillation (427 31) (I48 91)   4  BPH (benign prostatic hyperplasia) (600 00) (N40 0)   5  Carotid artery stenosis (433 10) (I65 29)   6  Hematuria, microscopic (599 72) (R31 2)   7  Hyperlipemia (272 4) (E78 5)   8  Hypertension (401 9) (I10)   9  Hypocalcemia (275 41) (E83 51)   10  Hypokalemia (276 8) (E87 6)   11  Hypothyroidism (244 9) (E03 9)   12  Lumbar foraminal stenosis (724 02) (M99 83)   13  Physician orders for life-sustaining treatment (POLST) form indicates patient wish for full    code resuscitation status (V49 89) (Z78 9)   14  Primary osteoarthritis of right knee (715 16) (M17 11)   15  Skin lesion of face (709 9) (L98 9)    Past Medical History    1  History of amaurosis fugax (V12 49) (Z86 69)   2  History of urinary tract infection (V13 02) (Z87 440)   3  History of Hyperthyroidism (242 90) (E05 90)   4  History of Inguinal hernia (550 90) (K40 90)   5  History of Postoperative state (V45 89) (Z98 89)   6  History of Shoulder pain (719 41) (M25 519)   7  History of Syncope (780 2) (R55)    The active problems and past medical history were reviewed and updated today  Surgical History    1  History of Endarterectomy Internal Carotid With Eversion And End-To-End Anastomosis   2  History of Hernia Repair    The surgical history was reviewed and updated today  Family History    1  No pertinent family history    The family history was reviewed and updated today  Social History    · Being A Social Drinker   · Former smoker (R68 90) (D97 683)   ·   The social history was reviewed and updated today  The social history was reviewed and is unchanged  Current Meds   1  AmLODIPine Besylate 2 5 MG Oral Tablet; TAKE 1 TABLET TWICE DAILY; Therapy: 10SOI9455 to (Evaluate:43Mgg8248)  Requested for: 93ZLR9352; Last   Rx:18Jan2016 Ordered   2  Atorvastatin Calcium 20 MG Oral Tablet; TAKE 1 TABLET DAILY; Therapy: 71Gwk5778 to (Evaluate:16Jun2016)  Requested for: 79KIL1339; Last   Rx:22Jun2015 Ordered   3  EQL Fish Oil 1000 MG Oral Capsule; TAKE 1 CAPSULE DAILY; Therapy: 99BBD1931 to (Evaluate:28Nov2014); Last Rx:10Nov2014 Ordered   4  Zoe 0 5-0 4 MG Oral Capsule; take 1 capsule daily; Therapy: 77OVG7825 to (Evaluate:28Nov2014); Last Rx:10Nov2014 Ordered   5  Levothyroxine Sodium 75 MCG Oral Tablet; Take 1 tablet daily; Therapy: 81JTZ6116 to (Hugh Estevez)  Requested for: 31RLO7293; Last   Rx:04Jan2016 Ordered   6  Potassium Chloride Debi ER 20 MEQ Oral Tablet Extended Release; take 4 tablets daily   as directed; Therapy: 85Lxh3647 to (Evaluate:12Jan2016)  Requested for: 00Skr2283; Last   Rx:17Apr2015 Ordered   7  Vitamin D 1000 UNIT CAPS; TAKE 2 CAPSULE Daily; Therapy: 23XTP8909 to (Evaluate:12Lfw8297); Last Rx:10Nov2014 Ordered   8  Warfarin Sodium 2 5 MG Oral Tablet; Take 1/2 to 1 tablet daily by mouth or as ordered by   physician; Therapy: 01QPZ3936 to (Evaluate:02Apr2016)  Requested for: 74 911 574; Last   Rx:08Apr2015 Ordered    The medication list was reviewed and updated today  Allergies    1  Penicillins    Immunizations  Influenza --- Rogelio Vargas: 26JCR9744     Vitals  Signs [Data Includes: Current Encounter]   Recorded: 39WVD7610 09:15AM   Heart Rate: 70, L Radial  Respiration: 16  Systolic: 035, LUE, Sitting  Diastolic: 60, LUE, Sitting  Height: 5 ft 8 5 in  Weight: 182 lb 0 96 oz  BMI Calculated: 27 28  BSA Calculated: 1 97    Future Appointments    Date/Time Provider Specialty Site   05/24/2016 02:30 PM OWEN Yanes  Orthopedic Surgery [facility]   04/20/2016 09:00 AM OWEN Rutledge   Family Medicine [facility] OFFICE     Signatures   Electronically signed by : Camryn Maldonado; Apr 5 2016 10:10AM EST                       (Author)    Electronically signed by :  OWEN Love ; Apr 5 2016 11:57AM EST                       (Author)

## 2018-01-10 NOTE — MISCELLANEOUS
Message  [facility]10[facility]  He normally resides in the independent living section at [facility], to which he returned  Pt was admitted w/dx of rehab, s/p (R )TKR  He did well at rehab and plans to cont w/PT and OT as outpt  Pt is on chronic coumadin and INR's were monitored during his stay  Pt was instructed to f/u w/pcp and specialist w/in one week of dc  He received rx for INR on 11/4, current dose of coumadin is 1 5mg daily  He received rx for tramadol q12hr prn for severe knee pain and was instructed to notify pcp w/inc pain or s/s of infection        Signatures   Electronically signed by : Camryn Parsons; Nov 1 2016 11:55AM EST                       (Author)

## 2018-01-11 NOTE — PROGRESS NOTES
Assessment    1  Primary osteoarthritis of right knee (715 16) (M17 11)   2  Status post total right knee replacement (V43 65) (Z96 651)   3  Hypertension (401 9) (I10)   4  Atrial fibrillation (427 31) (I48 91)   5  Hyperlipidemia (272 4) (E78 5)   6  AI (aortic insufficiency) (424 1) (I35 1)   7  BPH (benign prostatic hyperplasia) (600 00) (N40 0)   8  Edema of both legs (782 3) (R60 0)   9  Physician orders for life-sustaining treatment (POLST) form indicates patient wish for full   code resuscitation status (V49 89) (Z78 9)    Chief Complaint  Chief Complaint Free Text Note Form: Patient is being seen at [facility] unit for admission to [facility] after knee replacement, right  History of Present Illness  HPI: Patient had significant history of osteoarthritis  Had significant pain in the right knee, went to orthopedics  They recommended he have a knee replacement  He was seen as a preop on 5 October, and now presents posthospitalization for rehabilitation  Patient denies any problems today  He did mention that he is asking for a slightly reduced amount of physical therapy weekly so that he may stay in the [facility] unit for a longer period of time  Patient is previously been seen by Dr Cat Deras for atrial fibrillation, aortic insufficiency  He also sees vascular for abdominal aortic aneurysm, carotid stenosis  His blood pressures been doing relatively well  No particular concerns with that  Cholesterol is also been doing quite well  No concerns about that  Reviewed his case with physical therapy  They felt that he was doing extremely well at this point  Nursing staff was concerned that he did have some drainage from the right knee  Patient did not notice that there was any particular problems or issues with it  Review of Systems  Complete-Male:   Constitutional: No fever or chills, feels well, no tiredness, no recent weight gain or weight loss     Eyes: No complaints of eye pain, no red eyes, no discharge from eyes, no itchy eyes  ENT: no complaints of earache, no hearing loss, no nosebleeds, no nasal discharge, no sore throat, no hoarseness  Cardiovascular: No complaints of slow heart rate, no fast heart rate, no chest pain, no palpitations, no leg claudication, no lower extremity  Respiratory: No complaints of shortness of breath, no wheezing, no cough, no SOB on exertion, no orthopnea or PND  Gastrointestinal: No complaints of abdominal pain, no constipation, no nausea or vomiting, no diarrhea or bloody stools  Genitourinary: No complaints of dysuria, no incontinence, no hesitancy, no nocturia, no genital lesion, no testicular pain  Musculoskeletal: No complaints of arthralgia, no myalgias, no joint swelling or stiffness, no limb pain or swelling  Integumentary: No complaints of skin rash or skin lesions, no itching, no skin wound, no dry skin  Neurological: No compliants of headache, no confusion, no convulsions, no numbness or tingling, no dizziness or fainting, no limb weakness, no difficulty walking  Psychiatric: Is not suicidal, no sleep disturbances, no anxiety or depression, no change in personality, no emotional problems  Endocrine: No complaints of proptosis, no hot flashes, no muscle weakness, no erectile dysfunction, no deepening of the voice, no feelings of weakness  Hematologic/Lymphatic: No complaints of swollen glands, no swollen glands in the neck, does not bleed easily, no easy bruising  Active Problems    1  Abdominal aortic aneurysm, without rupture (441 4) (I71 4)   2  AI (aortic insufficiency) (424 1) (I35 1)   3  Atrial fibrillation (427 31) (I48 91)   4  BPH (benign prostatic hyperplasia) (600 00) (N40 0)   5  Carotid stenosis (433 10) (I65 29)   6  Chronic pain of right knee (906 72,874 63) (M25 561,G89 29)   7  Edema of both legs (782 3) (R60 0)   8  Hematuria, microscopic (599 72) (R31 29)   9  Hyperlipidemia (272 4) (E78 5)   10  Hypertension (401 9) (I10)   11  Hypocalcemia (275 41) (E83 51)   12  Hypokalemia (276 8) (E87 6)   13  Hypothyroidism (244 9) (E03 9)   14  Lumbar foraminal stenosis (724 02) (M99 83)   15  Physician orders for life-sustaining treatment (POLST) form indicates patient wish for full    code resuscitation status (V49 89) (Z78 9)   16  Preoperative cardiovascular examination (V72 81) (Z01 810)   17  Primary osteoarthritis of right knee (715 16) (M17 11)   18  Skin lesion of face (709 9) (L98 9)    Past Medical History    1  History of amaurosis fugax (V12 49) (Z86 69)   2  History of urinary tract infection (V13 02) (Z87 440)   3  History of Hyperthyroidism (242 90) (E05 90)   4  History of Inguinal hernia (550 90) (K40 90)   5  History of Postoperative state (V45 89) (Z98 890)   6  History of Shoulder pain (719 41) (M25 519)   7  History of Syncope (780 2) (R55)    Surgical History    1  History of Endarterectomy Internal Carotid With Eversion And End-To-End Anastomosis   2  History of Hernia Repair    Family History  Mother    1  No pertinent family history    Social History    · Being A Social Drinker   · Former smoker (S31 47) (I97 792)   ·     Current Meds   1  Atorvastatin Calcium 20 MG Oral Tablet; TAKE 1 TABLET DAILY; Therapy: 24Apr2015 to (Evaluate:07Jan2017)  Requested for: 57ZXJ4109; Last   Rx:30Wxe3279 Ordered   2  EQL Fish Oil 1000 MG Oral Capsule; TAKE 1 CAPSULE DAILY; Therapy: 68AKF4073 to (Evaluate:28Nov2014); Last Rx:10Nov2014 Ordered   3  Zoe 0 5-0 4 MG Oral Capsule (Dutasteride-Tamsulosin HCl); take 1 capsule daily; Therapy: 24XIQ5765 to (Evaluate:28Nov2014); Last Rx:10Nov2014 Ordered   4  Levothyroxine Sodium 75 MCG Oral Tablet; Take 1 tablet daily; Therapy: 03OGG1748 to (Evaluate:88Wjn9584)  Requested for: 45IQQ4694; Last   Rx:13Mpo3139 Ordered   5  OxyCODONE HCl - 5 MG Oral Tablet; TAKE 1 TABLET EVERY 6 HOURS AS NEEDED; Therapy: 79TPI1748 to (Jenniffer Goodpasture) Recorded   6  Potassium Chloride Debi ER 20 MEQ Oral Tablet Extended Release; take 4 tablets daily   as directed; Therapy: 36Wof2878 to (Evaluate:27Eme6445)  Requested for: 30Znn5343; Last   Rx:05Apr2016 Ordered   7  Protonix 40 MG Oral Tablet Delayed Release (Pantoprazole Sodium); take 1 tablet by   mouth once daily; Therapy: 31DBS9315 to (Esther Portillo) Recorded   8  TraMADol HCl - 50 MG Oral Tablet; TAKE 1 TABLET EVERY 6 HOURS AS NEEDED FOR   PAIN;   Therapy: 62PKV2207 to (Evaluate:23Nov2016) Recorded   9  Vitamin D 1000 UNIT CAPS; TAKE 2 CAPSULE Daily; Therapy: 40PAE6023 to (Evaluate:89Vrk9393); Last Rx:10Nov2014 Ordered   10  Warfarin Sodium 2 5 MG Oral Tablet; Take 1/2 to 1 tablet daily by mouth or as ordered by    physician; Therapy: 14CEB6951 to (Evaluate:02Apr2016)  Requested for: 74 911 574; Last    Rx:08Apr2015 Ordered    Allergies    1  Penicillins    Vitals  Signs   Recorded: 15WCW9663 87:88SE   Systolic: 721  Diastolic: 83  Heart Rate: 83  Respiration: 18  Temperature: 98 3 F  O2 Saturation: 96, RA  Weight: 186 lb 3 2 oz  BMI Calculated: 28 31  BSA Calculated: 1 98    Physical Exam    Constitutional   General appearance: No acute distress, well appearing and well nourished  Ears, Nose, Mouth, and Throat   External inspection of ears and nose: Normal     Nasal mucosa, septum, and turbinates: Normal without edema or erythema  Oropharynx: Normal with no erythema, edema, exudate or lesions  Pulmonary   Respiratory effort: No increased work of breathing or signs of respiratory distress  Auscultation of lungs: Clear to auscultation, equal breath sounds bilaterally, no wheezes, no rales, no rhonci  Cardiovascular   Auscultation of heart: Abnormal   The heart rate was normal  The rhythm was irregularly irregular  Heart sounds: normal S1, normal S2 and no gallop heard  no murmurs were heard     Examination of extremities for edema and/or varicosities: Abnormal   bilateral ankle 2+ pitting edema and bilateral pretibial 1+ pitting edema  Abdomen   Abdomen: Non-tender, no masses  Liver and spleen: No hepatomegaly or splenomegaly  Lymphatic   Palpation of lymph nodes in neck: No lymphadenopathy  Musculoskeletal   Digits and nails: Normal without clubbing or cyanosis  Inspection/palpation of joints, bones, and muscles: Normal     Skin Per nursing eval    Neurologic   Cranial nerves: Cranial nerves 2-12 intact  Reflexes: 2+ and symmetric  Sensation: No sensory loss  Psychiatric   Orientation to person, place and time: Normal     Mood and affect: Normal          Discussion/Summary  Discussion Summary:   #1: Primary osteoarthritis of the right knee: Status post replacement  Follow-up with Dr dan  #2: Status post right knee replacement: Patient was started on Coumadin per hospitalization  Follow-up with orthopedics  PT/INR as per [facility] protocol  #3: Hypertension: Stable  No changes at the moment  Blood pressure today was minimally elevated, though he was recently admitted  Based on this, no changes  #4: Atrial fibrillation: Patient on Coumadin per cardiology, as well as orthopedics restarting  At this point, I would recommend that he continue on Coumadin  We did briefly discuss the possibility of a novel anticoagulant, however he did not wish to do this secondary to cost  Based on this, continue with warfarin  #5: Hyperlipidemia: Stable  No changes recommended  Ration is on atorvastatin at this point  #6: Aortic insufficiency: Stable  Follows with cardiology  No changes  #7: BPH: Stable  No changes  Follow with urology as scheduled  No current symptoms per patient  #8: Edema of legs: This is likely secondary to IV fluids that were given in the hospital  We'll need to follow in the future  Of note, he did have a basic metabolic profile which did not show significant abnormalities here on admission    #9: POLST: Patient does have a POLST form showing that he is full code at this point  #10: Peripheral artery disease: Patient has multiple peripheral artery issues  He gets yearly follow up with vascular including ultrasounds  Please see the last office note (preop clearance)  Future Appointments    Date/Time Provider Specialty Site   10/28/2016 01:45 PM OWEN Greene  Orthopedic Surgery [address]   11/04/2016 01:00 PM OWEN Greene  Orthopedic Surgery [address]   12/07/2016 09:00 AM OWEN Saldivar  [address] OFF[address]   10/31/2016 08:40 AM OWEN Casillas  Cardiology [facility]     Signatures   Electronically signed by :  OWEN Greenberg ; Oct 24 2016  5:28PM EST                       (Author)

## 2018-01-11 NOTE — PROGRESS NOTES
Assessment    1  Hypothyroidism (244 9) (E03 9)   2  Abdominal aortic aneurysm, without rupture (441 4) (I71 4)   3  Thrombocytopenia (287 5) (D69 6)   4  Hypertension (401 9) (I10)   5  Atrial fibrillation (427 31) (I48 91)   6  Hyperlipidemia (272 4) (E78 5)    Plan    · From  Atorvastatin Calcium 20 MG Oral Tablet TAKE 1 TABLET DAILY To  Atorvastatin Calcium 20 MG Oral Tablet TAKE 0 5 TABLET Daily    · From  AmLODIPine Besylate 2 5 MG Oral Tablet TAKE 1 TABLET DAILY To  AmLODIPine Besylate 2 5 MG Oral Tablet TAKE 1 TABLET TWICE DAILY    · (1) TSH; Status:Active; Requested [facility]:[id number];     · (1) CBC/PLT/DIFF; Status:Active; Requested for:13Qez4581;     Discussion/Summary  Discussion Summary:   #1: Hypothyroidism: TSH is improved versus before  No changes at the moment  Recheck in approximately 6 months  #2: AAA: Patient is seen vascular  Would recommend follow-up with them in the future as scheduled  Also, we reviewed cholesterol and its effects on AAA  #3: Thrombocytopenia: Patient has not had a CBC recently  Check labs  Follow-up after that  Will be ordered for May with his next cholesterol test   #4: Hypertension: Blood pressure today is quite good  He is on amlodipine 2 5 mg taking one twice a day  This should continue for the time being  I did offer him the option of trying 5 mg daily, they preferred to stay with 2 5 twice a day  He will consider changing in the future for simple vacation of regimen  #5: Atrial fibrillation: Stable  Follows with Dr Diana Zamora  No changes at the moment  #6: Hyperlipidemia: Patient does have high cholesterol as a diagnosis, however his last cholesterol test was excellent on atorvastatin at 20 mg  Given that it was quite good, and he is concerned about side effects and problems with statins, we will decrease to 10 mg and recheck as scheduled in May   This should give us a very good idea as to whether or not he needs to continue on 20, or would be reasonable enough to decrease to 10  So far, he does not seem to of had any of the side effects of medication  Medication SE Review and Pt Understands Tx: Possible side effects of new medications were reviewed with the patient/guardian today  The treatment plan was reviewed with the patient/guardian  The patient/guardian understands and agrees with the treatment plan      Chief Complaint  Chief Complaint Free Text Note Form: Pt here for 3mth routine- HTN,Hypothyroidism,Afib, HLP  Pt requesting med review to discuss BP and urinary incont  meds      History of Present Illness  HPI: Patient did recently have laboratory studies done (yesterday)  TSH was 1 76  Reviewed atorvastatin  He is on 20mg  He was doing well in Nov  reviewed AAA  Urinary incontinence  He saw Dr Esperanza Moyer  Vesicare started  Has helped  After that, he called Dr Esperanza Moyer about the [facility]  The nurse there spoke with him  They were to discuss  Oxybutynin reviewed with them  He feels he is OK at this time, and did not want to take the oxybutynin initially  Review of Systems  Complete-Male:   Constitutional: No fever or chills, feels well, no tiredness, no recent weight gain or weight loss  Eyes: No complaints of eye pain, no red eyes, no discharge from eyes, no itchy eyes  ENT: no complaints of earache, no hearing loss, no nosebleeds, no nasal discharge, no sore throat, no hoarseness  Cardiovascular: No complaints of slow heart rate, no fast heart rate, no chest pain, no palpitations, no leg claudication, no lower extremity  Respiratory: No complaints of shortness of breath, no wheezing, no cough, no SOB on exertion, no orthopnea or PND  Gastrointestinal: No complaints of abdominal pain, no constipation, no nausea or vomiting, no diarrhea or bloody stools  Genitourinary: as noted in HPI  Musculoskeletal: No complaints of arthralgia, no myalgias, no joint swelling or stiffness, no limb pain or swelling     Integumentary: No complaints of skin rash or skin lesions, no itching, no skin wound, no dry skin  Neurological: No compliants of headache, no confusion, no convulsions, no numbness or tingling, no dizziness or fainting, no limb weakness, no difficulty walking  Psychiatric: Is not suicidal, no sleep disturbances, no anxiety or depression, no change in personality, no emotional problems  Endocrine: No complaints of proptosis, no hot flashes, no muscle weakness, no erectile dysfunction, no deepening of the voice, no feelings of weakness  Hematologic/Lymphatic: No complaints of swollen glands, no swollen glands in the neck, does not bleed easily, no easy bruising  Active Problems    1  Abdominal aortic aneurysm, without rupture (441 4) (I71 4)   2  Aftercare following right knee joint replacement surgery (V54 81,V43 65) (Z47 1,Z96 651)   3  AI (aortic insufficiency) (424 1) (I35 1)   4  Atrial fibrillation (427 31) (I48 91)   5  BPH (benign prostatic hyperplasia) (600 00) (N40 0)   6  Carotid stenosis (433 10) (I65 29)   7  Chronic pain of right knee (286 99,317 35) (M25 561,G89 29)   8  Cough (786 2) (R05)   9  Edema of both legs (782 3) (R60 0)   10  Hematuria, microscopic (599 72) (R31 29)   11  Hyperlipidemia (272 4) (E78 5)   12  Hypertension (401 9) (I10)   13  Hypocalcemia (275 41) (E83 51)   14  Hypokalemia (276 8) (E87 6)   15  Hypothyroidism (244 9) (E03 9)   16  Influenza (487 1) (J11 1)   17  Lumbar foraminal stenosis (724 02) (M99 83)   18  Physician orders for life-sustaining treatment (POLST) form indicates patient wish for full    code resuscitation status (V49 89) (Z78 9)   19  Preoperative cardiovascular examination (V72 81) (Z01 810)   20  Primary osteoarthritis of right knee (715 16) (M17 11)   21  Skin lesion of face (709 9) (L98 9)   22  Stasis edema of right lower extremity (459 30) (I87 301)   23  Status post total right knee replacement (V43 65) (Z96 651)   24  Swelling of calf (729 81) (M79 89)   25   Thrombocytopenia (287 5) (D69 6)    Past Medical History    1  History of amaurosis fugax (V12 49) (Z86 69)   2  History of urinary tract infection (V13 02) (Z87 440)   3  History of Hyperthyroidism (242 90) (E05 90)   4  History of Inguinal hernia (550 90) (K40 90)   5  History of Postoperative state (V45 89) (Z98 890)   6  History of Shoulder pain (719 41) (M25 519)   7  History of Syncope (780 2) (R55)    Surgical History    1  History of Endarterectomy Internal Carotid With Eversion And End-To-End Anastomosis   2  History of Hernia Repair   3  History of Total Knee Replacement Right  Surgical History Reviewed: The surgical history was reviewed and updated today  Family History  Mother    1  No pertinent family history  Family History Reviewed: The family history was reviewed and updated today  Social History    · Being A Social Drinker   · Former smoker (L25 63) (Z78 737)   ·   Social History Reviewed: The social history was reviewed and updated today  The social history was reviewed and is unchanged  Current Meds   1  AmLODIPine Besylate 2 5 MG Oral Tablet; TAKE 1 TABLET DAILY; Therapy: 44ZRU1579 to (21 )  Requested for: 31Oct2016 Recorded   2  Ascorbic Acid 500 MG CAPS; Vitamin C - Take 1 tablet twice daily; Therapy: (Recorded:31Oct2016) to Recorded   3  Atorvastatin Calcium 20 MG Oral Tablet; TAKE 1 TABLET DAILY; Therapy: 95DEZ2441 to (Evaluate:10Gec9690)  Requested for: 93VWP3503; Last   Rx:24Jan2017 Ordered   4  EQL Fish Oil 1000 MG Oral Capsule; TAKE 1 CAPSULE DAILY; Therapy: 81WBN4398 to (Evaluate:28Nov2017); Last Rx:10Nov2014 Ordered   5  Zoe 0 5-0 4 MG Oral Capsule; take 1 capsule daily; Therapy: 64BUM9372 to (Evaluate:28Nov2017); Last Rx:10Nov2014 Ordered   6  Levothyroxine Sodium 88 MCG Oral Tablet; TAKE 1 TABLET DAILY; Therapy: 55YRG1769 to (Evaluate:05Jun2017)  Requested for: 51MCN6740; Last   Rx:73Hzq9691 Ordered   7   Potassium Chloride Debi ER 20 MEQ Oral Tablet Extended Release; take 4 tablets daily   as directed; Therapy: 61Xuk8247 to (21 )  Requested for: 02OAQ6218; Last   Rx:30Jan2017 Ordered   8  VESIcare 5 MG Oral Tablet; Therapy: (Recorded:08Mar2017) to Recorded   9  Vitamin D 1000 UNIT CAPS; TAKE 2 CAPSULE Daily; Therapy: 81EGJ6473 to (Evaluate:31Iny1273); Last Rx:10Nov2014 Ordered   10  Warfarin Sodium 3 MG Oral Tablet; Take 1/2 to 1 tablet daily by mouth or as ordered by    physician; Therapy: 58LVW2562 to (Evaluate:25Jan2018)  Requested for: 20MWA2743; Last    Rx:30Jan2017 Ordered  Medication List Reviewed: The medication list was reviewed and updated today  Allergies    1  Penicillins    Vitals  Signs   Recorded: 81CWZ3680 09:05AM   Heart Rate: 65, L Radial  Respiration: 16  Systolic: 237, LUE, Sitting  Diastolic: 68, LUE, Sitting  Height: 5 ft 7 5 in  Weight: 179 lb   BMI Calculated: 27 62  BSA Calculated: 1 94  O2 Saturation: 97    Physical Exam    Constitutional   General appearance: No acute distress, well appearing and well nourished  Results/Data  PHQ-2 Adult Depression Screening 07JKF0505 09:07AM User, Ahs     Test Name Result Flag Reference   PHQ-2 Adult Depression Score 0     Over the last two weeks, how often have you been bothered by any of the following problems? Little interest or pleasure in doing things: Not at all - 0  Feeling down, depressed, or hopeless: Not at all - 0   PHQ-2 Adult Depression Screening Negative       Falls Risk Assessment (Dx Z13 89 Screen for Neurologic Disorder) 83LAR4562 09:07AM User, Ahs     Test Name Result Flag Reference   Falls Risk      No falls in the past year       Future Appointments    Date/Time Provider Specialty Site   04/14/2017 10:45 AM Karyle Lemon, M D  Orthopedic [address] [place]   Electronically signed by :  OWEN Bower ; Mar  8 2017  9:34AM EST                       (Author)

## 2018-01-12 NOTE — PROGRESS NOTES
REPORT NAME: Patient Visit Summary Report   VISIT DATE: 4/12/2016  VISIT TIME: 12:34 PM EDT  PATIENT NAME: Richi Scott  MEDICAL RECORD NUMBER: [id number]  SOCIAL SECURITY NUMBER:   YOB: 1925  AGE: 80  REFERRING PHYSICIAN: Steve Jeffers  SUPERVISING CLINICIAN: Jose Daniel Marin CARE PROFESSIONAL: [facility]  PATIENT HOME ADDRESS: [address] [place] [address], [address] [place] [address]  PATIENT HOME PHONE: [phone]  DIAGNOSIS 1: Unspecified atrial fibrillation / I48 91  DIAGNOSIS 2:   DIAGNOSIS 3:   DIAGNOSIS 4:   INR RANGE: 2 - 3  INR GOAL: 2 5  TREATMENT START DATE:   TREATMENT END DATE:   NEXT VISIT: 5[facility]10/2016  [facility]    VISIT RESULTS   ENCOUNTER NUMBER:   TEST LOCATION: [facility]  TEST TYPE:   VISIT TYPE:   CURRENT INR: 2 4 PROTIME:   SPECIMEN COL AND RPT DATE: 4/12/2016 12:34 PM  EDT    VITAL SIGNS  PULSE:  B/P:  WEIGHT:  HEIGHT:  TEMP:     CURRENT ANTICOAGULANT DOSING SCHEDULE  DOSE SIZE: 2 5mg    ANTICOAGULANT TYPE: WARFARIN  DOSING REGIMEN  Sun       Mon Tues Wed Thurs Fri       Sat  Total/Wk  1 25      2 5       1 25      1 25      1 25      2 5       1 25      11 25    PATIENT MEDICATION INSTRUCTION: Yes  PATIENT NUTRITIONAL COUNSELING: No  PATIENT BRUISING INSTRUCTION: No      LAST EDUCATION DATE:       PREVIOUS VISIT INFORMATION  VISITDATE   INR Goal  INR   Sun     Mon Tues Wed Thurs   Fri  Sat     Total/wk  4/12/2016   2 5       2 4   1 25    2 5     1 25    1 25    1 25    2 5  1 25    11 25  4/5/2016    2 5       2 4   1 25    2 5     1 25    1 25    1 25    2 5  1 25    11 25  3/8/2016    2 5       2 5   1 25    2 5     1 25    1 25    1 25    2 5  1 25    11 25  2/11/2016   2 5       2 16  1 25    2 5     1 25    1 25    1 25    2 5  1 25    11 25    ADDITIONAL PREVIOUS VISIT INFORMATION  VISITDATE   PRIMARY RX               DOSE      CrCl  4/12/2016   WARFARIN                 2 5mg 4/5/2016    WARFARIN                 2 5mg               3/8/2016    WARFARIN                 2 5mg               2/11/2016   WARFARIN                 2 5mg                   OTHER CURRENT MEDICATIONS: WARFARIN      PROGRESS NOTES: l/m for pt, retest inr in 4 wks   faxed orders to [address]:     TEST LOCATION: [place] [address]    Electronically signed by: Zeb Solomon on 4/12/2016 at 12:34 PM EDT

## 2018-01-12 NOTE — PROGRESS NOTES
Assessment    1  History of Abdominal aortic aneurysm, without rupture (441 4) (I71 4)   2  Atrial fibrillation (427 31) (I48 91)   3  Urge incontinence of urine (788 31) (N39 41)   4  Somnolence, daytime (780 54) (R40 0)    Plan    · [address]; Status:Hold For - Scheduling; Requested MTF:[id number];     · Follow-up visit in 1 month Evaluation and Treatment  Follow-up  Status: Hold For -  Scheduling  Requested for: 40UTV5802    · 2 - Susan GALVAN, Grady Luna  (Urology) Co-Management  *  Status: Hold For - Scheduling   Requested for: 08NJM6622  Care Summary provided  : Yes    · VESIcare 5 MG Oral Tablet    Discussion/Summary  Discussion Summary:   In summary we have a 49-year-old  male who comes in complaining of daytime somnolence  The patient gets up every hour and a half at nighttime and has no problem falling asleep again  He does get up because of increased urgency and the Vesicare did not really improve that dramatically  I will make an appointment for him with urology to further assess his emptying issues which I think are causing him to develop daytime somnolence  The patient also has history of AAA but I do not have any recent ultrasounds of his abdominal aorta  I will do one and have her return in approximately 4 weeks  Chief Complaint  Chief Complaint Free Text Note Form: Pt here for routine visit- Hypothyroidism, AAA, HTN, A FIB,HLD, Thromboctopenia  Review labs done 05/23/17  Request DMV physical form be complete while at [address] today  c/o numbing of finger tips intermittent x few mths and want to discuss D/C statins  visual test done today R 20/50, L 20/40, B/L 20/40 uncorrected      History of Present Illness  HPI: Patient is a 49-year-old  male who comes in for evaluation  He has a fairly extensive past medical history which includes an abdominal aortic aneurysm size unspecified  He has evidence of atrial fibrillation currently on Coumadin   He has history of aortic insufficiency, hyperlipidemia, hypertension, hypothyroidism  The patient comes in today for follow-up  He appears much younger than his stated age and is quite active  He does have some hearing loss and worse hearing aids  He denies any visual problems at present  He denies any palpitations or chest pain  He denies any shortness of breath  He does quite a bit of walking during the day which is his main form of exercise  Patient was given Vesicare for urge incontinence he took it for approximately a few weeks and his symptoms abated and he stopped it and has had no symptoms for 2 months  His chief complaint today is that of daytime somnolence he relates falling asleep during the daytime hours  He does get up frequently at nighttime every hour and a half  This may be playing a role as to why he feels tired in the next day  Review of Systems  Complete-Male:   Constitutional: No fever or chills, feels well, no tiredness, no recent weight gain or weight loss  Eyes: No complaints of eye pain, no red eyes, no discharge from eyes, no itchy eyes  ENT: no complaints of earache, no hearing loss, no nosebleeds, no nasal discharge, no sore throat, no hoarseness  Cardiovascular: No complaints of slow heart rate, no fast heart rate, no chest pain, no palpitations, no leg claudication, no lower extremity  Respiratory: No complaints of shortness of breath, no wheezing, no cough, no SOB on exertion, no orthopnea or PND  Gastrointestinal: No complaints of abdominal pain, no constipation, no nausea or vomiting, no diarrhea or bloody stools  Genitourinary: No complaints of dysuria, no incontinence, no hesitancy, no nocturia, no genital lesion, no testicular pain  Musculoskeletal: No complaints of arthralgia, no myalgias, no joint swelling or stiffness, no limb pain or swelling  Integumentary: No complaints of skin rash or skin lesions, no itching, no skin wound, no dry skin     Neurological: No compliants of headache, no confusion, no convulsions, no numbness or tingling, no dizziness or fainting, no limb weakness, no difficulty walking  Psychiatric: Is not suicidal, no sleep disturbances, no anxiety or depression, no change in personality, no emotional problems  Endocrine: No complaints of proptosis, no hot flashes, no muscle weakness, no erectile dysfunction, no deepening of the voice, no feelings of weakness  Hematologic/Lymphatic: No complaints of swollen glands, no swollen glands in the neck, does not bleed easily, no easy bruising  Geriatric Syndrome [address]:   the memory was impaired  has no depression  no falls  no recent weight loss  no vision impairment  hearing impairment  no incontinence  gait is normal     has no osteoporosis  with no delirium  no polypharmacy  Active Problems    1  Aftercare following right knee joint replacement surgery (V54 81,V43 65) (Z47 1,Z96 651)   2  AI (aortic insufficiency) (424 1) (I35 1)   3  Atrial fibrillation (427 31) (I48 91)   4  BPH (benign prostatic hyperplasia) (600 00) (N40 0)   5  Carotid stenosis (433 10) (I65 29)   6  Chronic pain of right knee (134 18,300 75) (M25 561,G89 29)   7  Cough (786 2) (R05)   8  Edema of both legs (782 3) (R60 0)   9  Hematuria, microscopic (599 72) (R31 29)   10  Hyperlipidemia (272 4) (E78 5)   11  Hypertension (401 9) (I10)   12  Hypocalcemia (275 41) (E83 51)   13  Hypokalemia (276 8) (E87 6)   14  Hypothyroidism (244 9) (E03 9)   15  Influenza (487 1) (J11 1)   16  Knee pain, right (719 46) (M25 561)   17  Lumbar foraminal stenosis (724 02) (M99 83)   18  Physician orders for life-sustaining treatment (POLST) form indicates patient wish for full    code resuscitation status (V49 89) (Z78 9)   19  Preoperative cardiovascular examination (V72 81) (Z01 810)   20  Primary osteoarthritis of right knee (715 16) (M17 11)   21  Skin lesion of face (709 9) (L98 9)   22   Stasis edema of right lower extremity (459 30) (I87 301)   23  Status post total right knee replacement (V43 65) (Z96 651)   24  Swelling of calf (729 81) (M79 89)   25  Thrombocytopenia (287 5) (D69 6)    Past Medical History    1  History of Abdominal aortic aneurysm, without rupture (441 4) (I71 4)   2  History of amaurosis fugax (V12 49) (Z86 69)   3  History of urinary tract infection (V13 02) (Z87 440)   4  History of Hyperthyroidism (242 90) (E05 90)   5  History of Inguinal hernia (550 90) (K40 90)   6  History of Postoperative state (V45 89) (Z98 890)   7  History of Shoulder pain (719 41) (M25 519)   8  History of Syncope (780 2) (R55)    Surgical History    1  History of Endarterectomy Internal Carotid With Eversion And End-To-End Anastomosis   2  History of Hernia Repair   3  History of Total Knee Replacement Right  Surgical History Reviewed: The surgical history was reviewed and updated today  Family History  Mother    1  No pertinent family history  Family History Reviewed: The family history was reviewed and updated today  Social History    · Being A Social Drinker   · Former smoker (Y75 96) (X89 991)   ·   Social History Reviewed: The social history was reviewed and updated today  Current Meds   1  AmLODIPine Besylate 2 5 MG Oral Tablet; Take 1 tablet twice daily; Therapy: 81LKX0350 to (Evaluate:11Jul2017)  Requested for: 21SCX0992; Last   Rx:13Mar2017 Ordered   2  Ascorbic Acid 500 MG CAPS; Vitamin C - Take 1 tablet twice daily; Therapy: (Recorded:31Oct2016) to Recorded   3  Atorvastatin Calcium 20 MG Oral Tablet; take 0 5 tablet daily; Therapy: 81MED3094 to (Evaluate:06Jun2017)  Requested for: 64XLT5591; Last   Rx:08Mar2017 Ordered   4  EQL Fish Oil 1000 MG Oral Capsule; TAKE 1 CAPSULE DAILY; Therapy: 34CSR5043 to (Evaluate:28Nov2017); Last Rx:10Nov2014 Ordered   5  Zoe 0 5-0 4 MG Oral Capsule; take 1 capsule daily; Therapy: 08RXZ3648 to (Evaluate:28Nov2017); Last Rx:10Nov2014 Ordered   6   Levothyroxine Sodium 88 MCG Oral Tablet; TAKE 1 TABLET DAILY; Therapy: 61NUG3296 to (05 12 73 93 30)  Requested for: 99JBL1308; Last   Rx:05Jun2017; Status: ACTIVE - Retrospective By Protocol Authorization Ordered   7  Potassium Chloride Debi ER 20 MEQ Oral Tablet Extended Release; take 4 tablets daily   as directed; Therapy: 96Kbv6465 to (05 12 73 93 30)  Requested for: 06EGR9129; Last   Rx:30Jan2017 Ordered   8  VESIcare 5 MG Oral Tablet; Therapy: (Recorded:08Mar2017) to Recorded   9  Vitamin D 1000 UNIT CAPS; TAKE 2 CAPSULE Daily; Therapy: 41ETD8824 to (Evaluate:06Hxw5813); Last Rx:10Nov2014 Ordered   10  Warfarin Sodium 3 MG Oral Tablet; Take 1/2 to 1 tablet daily by mouth or as ordered by    physician; Therapy: 98CRF6509 to (Evaluate:25Jan2018)  Requested for: 31JBM0969; Last    Rx:30Jan2017 Ordered    Allergies    1  Penicillins    Vitals  Signs   Recorded: 99PJW9168 10:02AM   Heart Rate: 72, R Radial  Respiration: 16  Systolic: 507, RUE, Sitting  Diastolic: 80, RUE, Sitting  Height: 5 ft 8 in  Weight: 178 lb 0 8 oz  BMI Calculated: 27 07  BSA Calculated: 1 95  Recorded: 41DCZ8876 09:51AM   Height: 5 ft 7 5 in    Physical Exam    Constitutional   General appearance: No acute distress, well appearing and well nourished  Eyes   Conjunctiva and lids: No erythema, swelling or discharge  Pupils and irises: Equal, round, reactive to light  Ophthalmoscopic examination: Normal fundi and optic discs  Ears, Nose, Mouth, and Throat   External inspection of ears and nose: Normal     Otoscopic examination: Tympanic membranes translucent with normal light reflex  Canals patent without erythema  Hearing: Normal     Conversational Hearing: Normal     Nasal mucosa, septum, and turbinates: Normal without edema or erythema  Lips, teeth, and gums: Normal, good dentition  Oropharynx: Normal with no erythema, edema, exudate or lesions  Neck   Neck: Supple, symmetric, trachea midline, no masses  Thyroid: Normal, no thyromegaly  Pulmonary   Respiratory effort: No increased work of breathing or signs of respiratory distress  Percussion of chest: Normal     Palpation of chest: Normal     Auscultation of lungs: Clear to auscultation  Cardiovascular   Palpation of heart: Normal PMI, no thrills  Auscultation of heart: Normal rate and rhythm, normal S1 and S2, no murmurs  Carotid pulses: 2+ bilaterally  Abdominal aorta: Normal     Femoral pulses: 2+ bilaterally  Pedal pulses: 2+ bilaterally  Examination of extremities for edema and/or varicosities: Normal     Chest   Breasts: Normal, no dimpling or skin changes appreciated  Palpation of breasts and axillae: Normal, no masses palpated  Chest: Normal     Abdomen   Abdomen: Non-tender, no masses  Liver and spleen: No hepatomegaly or splenomegaly  Examination for hernias: No hernias appreciated  Anus, perineum, and rectum: Normal sphincter tone, no masses, no prolapse  Stool sample for occult blood: Negative  Genitourinary   Scrotal contents: Normal testes, no masses  Penis: Normal, no lesions  Digital rectal exam of prostate: Normal size, no masses  Lymphatic   Palpation of lymph nodes in neck: No lymphadenopathy  Palpation of lymph nodes in axillae: No lymphadenopathy  Palpation of lymph nodes in groin: No lymphadenopathy  Palpation of lymph nodes in other areas: No lymphadenopathy  Musculoskeletal   Gait and station: Normal     Inspection/palpation of digits and nails: Normal without clubbing or cyanosis  Inspection/palpation of joints, bones, and muscles: Normal     Range of motion: Normal     Stability: Normal     Muscle strength/tone: Normal     Skin   Skin and subcutaneous tissue: Normal without rashes or lesions  Palpation of skin and subcutaneous tissue: Normal turgor  Neurologic   Cranial nerves: Cranial nerves 2-12 intact  Reflexes: 2+ and symmetric      Sensation: No sensory loss       Psychiatric   Judgment and insight: Normal     Orientation to person, place and time: Normal     Recent and remote memory: Intact  Mood and affect: Normal        Future Appointments    Date/Time Provider Specialty Site   10/13/2017 11:00 AM OWEN Maldonado  Orthopedic Surgery [facility] MEDICAL [facility] SURGICAL [facility]   07/05/2017 01:40 PM OWEN Shrestha   Cardiology [facility]     Signatures   Electronically signed by : OWEN Longoria ; Jun 5 2017 10:32AM EST                       (Author)

## 2018-01-12 NOTE — MISCELLANEOUS
Assessment   1  Primary osteoarthritis of right knee (715 16) (M17 11)  2  Stasis edema of right lower extremity (459 30) (I87 301)  3  Hypertension (401 9) (I10)  4  Hyperlipidemia (272 4) (E78 5)    Plan  Primary osteoarthritis of right knee, Stasis edema of right lower extremity    · 1 - Benjamin GALVAN, Edouard Mosqueda  (Orthopedic Surgery) Physician Referral  Consult  Status: Active   Requested for: 88TUU0017  Ordered Stat;For: Primary osteoarthritis of right knee, Stasis edema of right lower   extremity;  Ordered By: Sue La Sal  Performed:   Due: 75LMK5680  () Care Summary provided  : Yes    Discussion/Summary  Discussion Summary:   #1: Osteo-arthritis of the right knee: Status post replacement  At this point, he does have significant redness and swelling over the entire right lower extremity  Would strongly recommend that he follow with Dr Sakshi Ospina today, or one of his partners  Otherwise, no changes at the moment from here  If he is unable to get to orthopedics today, consider going to the emergency room or [place] [address]  #2: Stasis edema of the right lower extremity: Patient does have swelling in the right lower extremity: Question if this is secondary to clot versus irritation or infection or surgical  At this point, his INR was slightly elevated previously, so it is unlikely to be a DVT  Again, recommend orthopedics today  #3: Hypertension: Stable  No change at the moment  #4: Hyperlipidemia: We'll check in the future  No changes  Chief Complaint  Chief Complaint Free Text Note Form: Pt here for [facility] visit DC from [facility] 10/31/16   S/P RIGHT KNEE REPLACEMENT  Concern today that right ankle still swollen since surgery 1  and want to discuss Statins       1 Amended By: Reyes Specking; Nov 07 2016 3:04 PM EST    History of Present Illness  TCM Communication [facility]: The patient is being contacted for [address]  The date of admission: 10/22/2016, date of discharge: 10/31/2016  Diagnosis: S/P RIGHT KNEE REPLACEMENT  He was discharged to home  Symptoms: leg pain right side and swelling, but no fever, no dizziness, no headache, no fatigue, no cough, no shortness of breath, no chest pain, no back pain on left side, no back pain on right side, no arm pain left side, no arm pain on right side, no leg pain on left side, no upper abdominal pain, no middle abdominal pain, no lower abdominal pain, no rash:, no anorexia, no nausea, no vomiting, no loose stools, no constipation, no pain with urinating, no incisional pain and no wound drainage  weakness in right knee   Communication performed and completed by   HPI: Patient did recently have his knee replacement  It is the right knee  He is doing quite well at this point as far as the physical therapy range of motion standpoint  Unfortunate, he is noting there is some swelling still, and also some irritation with it  He is noticing some aching, as well as swollen in the right lower extremity  He was quite concerned about that  Review of Systems  Complete-Male [facility]:   Constitutional: negative  Head and Face: negative  Eyes: negative  ENT: negative  Cardiovascular: negative  Respiratory: negative  Gastrointestinal: negative  Genitourinary: negative  Musculoskeletal: as noted in HPI  Integumentary and Breasts: negative  Neurological: negative  Psychiatric: negative  Endocrine: negative  Hematologic and Lymphatic: negative  (on a scale of 0 to 10, the patient rates the pain at 8 ) When medication wears off, it gets to 7-8  Active Problems   1  Abdominal aortic aneurysm, without rupture (441 4) (I71 4)  2  Aftercare following right knee joint replacement surgery (V54 81,V43 65) (Z47 1,Z96 651)  3  AI (aortic insufficiency) (424 1) (I35 1)  4  Atrial fibrillation (427 31) (I48 91)  5  BPH (benign prostatic hyperplasia) (600 00) (N40 0)  6  Carotid stenosis (433 10) (I65 29)  7   Chronic pain of right knee (284 94,324 57) (M25 561,G89 29)  8  Edema of both legs (782 3) (R60 0)  9  Hematuria, microscopic (599 72) (R31 29)  10  Hyperlipidemia (272 4) (E78 5)  11  Hypertension (401 9) (I10)  12  Hypocalcemia (275 41) (E83 51)  13  Hypokalemia (276 8) (E87 6)  14  Hypothyroidism (244 9) (E03 9)  15  Lumbar foraminal stenosis (724 02) (M99 83)  16  Physician orders for life-sustaining treatment (POLST) form indicates patient wish for full    code resuscitation status (V49 89) (Z78 9)  17  Preoperative cardiovascular examination (V72 81) (Z01 810)  18  Primary osteoarthritis of right knee (715 16) (M17 11)  19  Skin lesion of face (709 9) (L98 9)  20  Status post total right knee replacement (V43 65) (Z96 651)  21  Thrombocytopenia (287 5) (D69 6)    Past Medical History   1  History of amaurosis fugax (V12 49) (Z86 69)  2  History of urinary tract infection (V13 02) (Z87 440)  3  History of Hyperthyroidism (242 90) (E05 90)  4  History of Inguinal hernia (550 90) (K40 90)  5  History of Postoperative state (V45 89) (Z98 890)  6  History of Shoulder pain (719 41) (M25 519)  7  History of Syncope (780 2) (R55)    Surgical History   1  History of Endarterectomy Internal Carotid With Eversion And End-To-End Anastomosis  2  History of Hernia Repair  3  History of Total Knee Replacement Right  Surgical History Reviewed: The surgical history was reviewed and updated today  Family History  Mother   1  No pertinent family history  Family History Reviewed: The family history was reviewed and updated today  Social History    · Being A Social Drinker   · Former smoker (B85 68) (D62 276)   ·   Social History Reviewed: The social history was reviewed and updated today  The social history was reviewed and is unchanged  Current Meds  1  AmLODIPine Besylate 2 5 MG Oral Tablet; TAKE 1 TABLET DAILY; Therapy: 65VTU1931 to (292 56 136)  Requested for: 31Oct2016 Recorded  2   Ascorbic Acid 500 MG CAPS; Vitamin C - Take 1 tablet twice daily; Therapy: (Recorded:31Oct2016) to Recorded  3  Atorvastatin Calcium 20 MG Oral Tablet; TAKE 1 TABLET DAILY; Therapy: 24Apr2015 to (Evaluate:07Jan2017)  Requested for: 16DKG5314; Last   Rx:19Pqb8604 Ordered  4  EQL Fish Oil 1000 MG Oral Capsule; TAKE 1 CAPSULE DAILY; Therapy: 92RBQ6771 to (Evaluate:28Nov2017); Last Rx:10Nov2014 Ordered  5  Ferrous Sulfate 325 (65 Fe) MG Oral Tablet; TAKE 1 TABLET BY MOUTH TWICE A DAY   WITH MEALS; Therapy: (Recorded:31Oct2016) to Recorded  6  Folic Acid 1 MG Oral Tablet; TAKE 1 TABLET DAILY; Therapy: (Recorded:31Oct2016) to Recorded  7  Zoe 0 5-0 4 MG Oral Capsule; take 1 capsule daily; Therapy: 98PAT1188 to (Evaluate:28Nov2017); Last Rx:10Nov2014 Ordered  8  Levothyroxine Sodium 75 MCG Oral Tablet; Take 1 tablet daily; Therapy: 99COH7971 to (Evaluate:40Zfb0036)  Requested for: 83LCA3752; Last   Rx:37Xfc4886 Ordered  9  OxyCODONE HCl - 5 MG Oral Tablet; TAKE 1 TABLET EVERY 6 HOURS AS NEEDED; Therapy: 91LER5513 to (Ruffus Pitch) Recorded  10  Potassium Chloride Debi ER 20 MEQ Oral Tablet Extended Release; take 4 tablets daily    as directed; Therapy: 30Ccw4351 to (Evaluate:26Cba6373)  Requested for: 20Apr2016; Last    Rx:05Apr2016 Ordered  11  Protonix 40 MG Oral Tablet Delayed Release; take 1 tablet by mouth once daily; Therapy: 40XKR4303 to (Ruffus Pitch) Recorded  12  TraMADol HCl - 50 MG Oral Tablet; TAKE 1 TABLET EVERY 6 HOURS AS NEEDED FOR    PAIN;    Therapy: 43WVL0527 to (Evaluate:23Nov2016) Recorded  13  Vitamin D 1000 UNIT CAPS; TAKE 2 CAPSULE Daily; Therapy: 58GHL2066 to (Evaluate:88Iwm6583); Last Rx:10Nov2014 Ordered  14  Warfarin Sodium 2 5 MG Oral Tablet; Take 1/2 to 1 tablet daily by mouth or as ordered by    physician; Therapy: 31HRL4605 to (Ruffus Pitch)  Requested for: 24Oct2016 Recorded  Medication List Reviewed: The medication list was reviewed and updated today  Allergies   1  Penicillins    Physical Exam    Constitutional   General appearance: No acute distress, well appearing and well nourished  Head and Face   Head and face: Normal     Eyes   Conjunctiva and lids: No erythema, swelling or discharge  Pupils and irises: Equal, round, reactive to light  EOM: normal    Ears, Nose, Mouth, and Throat   External inspection of ears and nose: Normal     Neck   Neck: Supple, symmetric, trachea midline, no masses  Thyroid: Normal, no thyromegaly  Pulmonary   Respiratory effort: No increased work of breathing or signs of respiratory distress  Auscultation of lungs: Clear to auscultation  Cardiovascular   Auscultation of heart: Abnormal   The heart rate was normal  The rhythm was irregularly irregular  Heart sounds: normal S1, normal S2 and no gallop heard  no murmurs were heard  Carotid pulses: 2+ bilaterally  Examination of extremities for edema and/or varicosities: Abnormal   right ankle 2+ pitting edema and right pretibial 2+ pitting edema, but no left ankle edema and no left pretibial edema  Abdomen   Abdomen: Non-tender, no masses  Musculoskeletal   Inspection/palpation of joints, bones, and muscles: Abnormal   (Right lower extremity appears to be warm and swollen, from approximately mid thigh to ankle and foot  There is some redness along with this  Incision line appears to be clean, intact, dry)        Future Appointments    Date/Time Provider Specialty Site   11/25/2016 11:00 AM OWEN Michael  Orthopedic Surgery [facility]IL[facility] [place] MEDICAL AND SURGICAL [facility]   12/07/2016 09:00 AM OWEN Quintero  Family Medicine [address] [place] [address] OFFICE     Signatures   Electronically signed by : Rosi Severe, M D ; Nov 7 2016 11:02AM EST                       (Author)    Electronically signed by :  Rosi Severe, M D ; Nov 9 2016  5:27PM EST

## 2018-01-13 VITALS
HEIGHT: 68 IN | HEART RATE: 82 BPM | WEIGHT: 179 LBS | BODY MASS INDEX: 27.13 KG/M2 | DIASTOLIC BLOOD PRESSURE: 70 MMHG | SYSTOLIC BLOOD PRESSURE: 130 MMHG

## 2018-01-13 VITALS
DIASTOLIC BLOOD PRESSURE: 68 MMHG | HEIGHT: 70 IN | HEART RATE: 66 BPM | WEIGHT: 179 LBS | BODY MASS INDEX: 25.62 KG/M2 | SYSTOLIC BLOOD PRESSURE: 119 MMHG

## 2018-01-13 NOTE — PROGRESS NOTES
REPORT NAME: Patient Visit Summary Report   VISIT DATE: 11/22/2016  VISIT TIME: 3:15 PM EST  PATIENT NAME: Kvng Pa  MEDICAL RECORD NUMBER: [id number]  SOCIAL SECURITY NUMBER:   YOB: 1925  AGE: 80  REFERRING PHYSICIAN: Caryn Koyanagi  SUPERVISING CLINICIAN: Jose Daniel Marin CARE PROFESSIONAL: Mey Juares  PATIENT HOME ADDRESS: [address] [place] [address], [address] [place] [address]  PATIENT HOME PHONE: [phone]  DIAGNOSIS 1: Unspecified atrial fibrillation / I48 91  DIAGNOSIS 2:   DIAGNOSIS 3:   DIAGNOSIS 4:   INR RANGE: 2 - 3  INR GOAL: 2 5  TREATMENT START DATE:   TREATMENT END DATE:   NEXT VISIT: 12/20/2016  [facility]    VISIT RESULTS   ENCOUNTER NUMBER:   TEST LOCATION: [facility]  TEST TYPE:   VISIT TYPE:   CURRENT INR: 2 8 PROTIME:   SPECIMEN COL AND RPT DATE: 11/22/2016 3:15 PM  EST    VITAL SIGNS  PULSE:  B/P:  WEIGHT:  HEIGHT:  TEMP:     CURRENT ANTICOAGULANT DOSING SCHEDULE  DOSE SIZE: 2 5mg    ANTICOAGULANT TYPE: WARFARIN  DOSING REGIMEN  Sun       Mon       Tues Wed Thurs Fri       Sat  Total/Wk  3         1 5       1 5       1 5       1 5       1 5       1 5       12    PATIENT MEDICATION INSTRUCTION: Yes  PATIENT NUTRITIONAL COUNSELING: No  PATIENT BRUISING INSTRUCTION: No      LAST EDUCATION DATE:       PREVIOUS VISIT INFORMATION  VISITDATE   INR Goal  INR   Sun     Mon     Tues Wed Thurs   Fri  Sat     Total/wk  11/22/2016  2 5       2 8   3       1 5     1 5     1 5     1 5     1 5  1 5     12  11/10/2016  2 5       2 2   1 25    2 5     1 25    1 25    1 25    2 5  1 25    11 25  11/3/2016   2 5       3 3   1 25    2 5     1 25    1 25    HOLD    2 5  1 25    10  1 25    1 25    2 5     1 25    1 25    2 5  1 25    11 25  10/4/2016   2 5       2     1 25    2 5     1 25    1 25    1 25    2 5  1 25    11 25    ADDITIONAL PREVIOUS VISIT INFORMATION  VISITDATE   PRIMARY RX               DOSE CrCl  11/22/2016  WARFARIN                 2 5mg               11/10/2016  WARFARIN                 2 5mg               11/3/2016   WARFARIN                 2 5mg               10/4/2016   WARFARIN                 2 5mg                   OTHER CURRENT MEDICATIONS: WARFARIN, WARFARIN SOD      PROGRESS NOTES: gave dosage to pt  retest inr in 4 wks, faxed orders to  [facility]  ---- Additional Notes entered on 12/7/2016 2:19 PM EST by Lei Bailey :  pt was discharged with a 3mg tablet after knee replacement  pt wants to use  them and then return back to 2 5mg when they are finished   faxed orders to  [facility] retest inr on dec 20    PATIENT INSTRUCTIONS:     TEST LOCATION: [place] [address]    Electronically signed by: Lei Bailey on 12/7/2016 at 2:19 PM EST

## 2018-01-14 VITALS
DIASTOLIC BLOOD PRESSURE: 60 MMHG | HEIGHT: 70 IN | SYSTOLIC BLOOD PRESSURE: 120 MMHG | HEART RATE: 72 BPM | BODY MASS INDEX: 25.62 KG/M2 | WEIGHT: 179 LBS

## 2018-01-14 VITALS
SYSTOLIC BLOOD PRESSURE: 150 MMHG | WEIGHT: 179 LBS | BODY MASS INDEX: 27.13 KG/M2 | HEART RATE: 72 BPM | DIASTOLIC BLOOD PRESSURE: 78 MMHG | HEIGHT: 68 IN

## 2018-01-14 VITALS
HEIGHT: 68 IN | RESPIRATION RATE: 16 BRPM | DIASTOLIC BLOOD PRESSURE: 80 MMHG | OXYGEN SATURATION: 97 % | BODY MASS INDEX: 26.98 KG/M2 | WEIGHT: 178.05 LBS | SYSTOLIC BLOOD PRESSURE: 144 MMHG | HEART RATE: 72 BPM

## 2018-01-14 VITALS
HEART RATE: 65 BPM | WEIGHT: 179 LBS | SYSTOLIC BLOOD PRESSURE: 120 MMHG | OXYGEN SATURATION: 97 % | RESPIRATION RATE: 16 BRPM | DIASTOLIC BLOOD PRESSURE: 68 MMHG | HEIGHT: 68 IN | BODY MASS INDEX: 27.13 KG/M2

## 2018-01-14 VITALS
HEIGHT: 68 IN | DIASTOLIC BLOOD PRESSURE: 73 MMHG | WEIGHT: 179 LBS | SYSTOLIC BLOOD PRESSURE: 147 MMHG | HEART RATE: 77 BPM | BODY MASS INDEX: 27.13 KG/M2

## 2018-01-15 NOTE — PROGRESS NOTES
Assessment  Assessed    1  Atrial fibrillation (427 31) (I48 91)   2  BPH (benign prostatic hyperplasia) (600 00) (N40 0)   3  AAA (abdominal aortic aneurysm) (441 4) (I71 4)    Plan  AAA (abdominal aortic aneurysm)    · US ABDOMINAL AORTA; Status:Hold For - Scheduling; Requested for:01Ute8472;    · Follow-up visit in 6 weeks Evaluation and Treatment  Follow-up  Status: Hold For -  Scheduling  Requested for: 51DRO1217    Chief Complaint  Chief Complaint Free Text Note Form: Pt here for 1mth f/u- Hx AAA      History of Present Illness  HPI: Patient is a 51-year-old  male who returns to the office to review studies performed  One of them was an echo of the retroperitoneum to really look at his kidneys this was ordered by   [facility]  He does have a simple cyst arising the lower pole of the left kidney there is marked enlargement of the prostate gland with mass effect of the bladder base  The urinary bladder was otherwise unremarkable and emptied normally  He has a follow-up with   [facility] located [facility] in the next couple weeks  A copy of the report was given to the patient  The patient's already taking Proscar-Hytrin combination  Because of his abdominal aorta and history of abdominal aortic aneurysm last study performed on September 9, 2014 a repeat study was done  He does have an ectatic with fusiform dilatation of the infrarenal abdominal aorta noted maximal axial dimension is 2 3 cm the distal abdominal aorta measured approximately 3 8 cm it had previously measured 3 5  No surgical intervention is warranted unless that goes over 5 cm  I will need to follow with a yearly ultrasound his abdominal aorta  Review of Systems  Complete-Male:   Constitutional: No fever or chills, feels well, no tiredness, no recent weight gain or weight loss  Eyes: No complaints of eye pain, no red eyes, no discharge from eyes, no itchy eyes     ENT: no complaints of earache, no hearing loss, no nosebleeds, no nasal discharge, no sore throat, no hoarseness  Cardiovascular: No complaints of slow heart rate, no fast heart rate, no chest pain, no palpitations, no leg claudication, no lower extremity  Respiratory: No complaints of shortness of breath, no wheezing, no cough, no SOB on exertion, no orthopnea or PND  Gastrointestinal: No complaints of abdominal pain, no constipation, no nausea or vomiting, no diarrhea or bloody stools  Genitourinary: No complaints of dysuria, no incontinence, no hesitancy, no nocturia, no genital lesion, no testicular pain  Musculoskeletal: No complaints of arthralgia, no myalgias, no joint swelling or stiffness, no limb pain or swelling  Integumentary: No complaints of skin rash or skin lesions, no itching, no skin wound, no dry skin  Neurological: No compliants of headache, no confusion, no convulsions, no numbness or tingling, no dizziness or fainting, no limb weakness, no difficulty walking  Psychiatric: Is not suicidal, no sleep disturbances, no anxiety or depression, no change in personality, no emotional problems  Endocrine: No complaints of proptosis, no hot flashes, no muscle weakness, no erectile dysfunction, no deepening of the voice, no feelings of weakness  Hematologic/Lymphatic: No complaints of swollen glands, no swollen glands in the neck, does not bleed easily, no easy bruising  Active Problems  Problems    1  Aftercare following right knee joint replacement surgery (V54 81,V43 65) (Z47 1,Z96 651)   2  AI (aortic insufficiency) (424 1) (I35 1)   3  Atrial fibrillation (427 31) (I48 91)   4  BPH (benign prostatic hyperplasia) (600 00) (N40 0)   5  Carotid stenosis (433 10) (I65 29)   6  Chronic pain of right knee (748 15,137 76) (M25 561,G89 29)   7  Cough (786 2) (R05)   8  Edema of both legs (782 3) (R60 0)   9  Hematuria, microscopic (599 72) (R31 29)   10  Hyperlipidemia (272 4) (E78 5)   11  Hypertension (401 9) (I10)   12  Hypocalcemia (275 41) (E83 51)   13  Hypokalemia (276 8) (E87 6)   14  Hypothyroidism (244 9) (E03 9)   15  Influenza (487 1) (J11 1)   16  Knee pain, right (719 46) (M25 561)   17  Lumbar foraminal stenosis (724 02) (M99 83)   18  Physician orders for life-sustaining treatment (POLST) form indicates patient wish for full    code resuscitation status (V49 89) (Z78 9)   19  Preoperative cardiovascular examination (V72 81) (Z01 810)   20  Primary osteoarthritis of right knee (715 16) (M17 11)   21  Skin lesion of face (709 9) (L98 9)   22  Somnolence, daytime (780 54) (R40 0)   23  Stasis edema of right lower extremity (459 30) (I87 301)   24  Status post total right knee replacement (V43 65) (Z96 651)   25  Swelling of calf (729 81) (M79 89)   26  Thrombocytopenia (287 5) (D69 6)   27  Urge incontinence of urine (788 31) (N39 41)    Past Medical History  Problems    1  History of Abdominal aortic aneurysm, without rupture (441 4) (I71 4)   2  History of amaurosis fugax (V12 49) (Z86 69)   3  History of urinary tract infection (V13 02) (Z87 440)   4  History of Hyperthyroidism (242 90) (E05 90)   5  History of Inguinal hernia (550 90) (K40 90)   6  History of Postoperative state (V45 89) (Z98 890)   7  History of Shoulder pain (719 41) (M25 519)   8  History of Syncope (780 2) (R55)    Surgical History  Problems    1  History of Endarterectomy Internal Carotid With Eversion And End-To-End Anastomosis   2  History of Hernia Repair   3  History of Total Knee Replacement Right  Surgical History Reviewed: The surgical history was reviewed and updated today  Family History  Mother    1  No pertinent family history  Family History Reviewed: The family history was reviewed and updated today  Social History  Problems    · Being A Social Drinker   · Former smoker (G29 31) (C63 895)   ·   Social History Reviewed: The social history was reviewed and updated today  Current Meds   1  AmLODIPine Besylate 2 5 MG Oral Tablet;  Take 1 tablet twice daily; Therapy: 62RNK9108 to (Evaluate:88Hpo7294)  Requested for: 71NEB2055; Last   Rx:13Mar2017 Ordered   2  Ascorbic Acid 500 MG CAPS; Vitamin C - Take 1 tablet twice daily; Therapy: (Recorded:31Oct2016) to Recorded   3  Atorvastatin Calcium 20 MG Oral Tablet; take 0 5 tablet daily; Therapy: 05DGG3099 to (Evaluate:06Jun2017)  Requested for: 24EHC8443; Last   Rx:08Mar2017 Ordered   4  EQL Fish Oil 1000 MG Oral Capsule; TAKE 1 CAPSULE DAILY; Therapy: 56ROS1417 to (Evaluate:28Nov2017); Last Rx:10Nov2014 Ordered   5  Zoe 0 5-0 4 MG Oral Capsule; take 1 capsule daily; Therapy: 10FTS4572 to (Evaluate:28Nov2017); Last Rx:10Nov2014 Ordered   6  Levothyroxine Sodium 88 MCG Oral Tablet; TAKE 1 TABLET DAILY; Therapy: 72PZH4617 to ([id number])  Requested for: 30Jun2017; Last   Rx:05Jun2017 Ordered   7  Potassium Chloride Debi ER 20 MEQ Oral Tablet Extended Release; take 4 tablets daily   as directed; Therapy: 99Qvg0449 to ([id number])  Requested for: 58MAT9628; Last   Rx:30Jan2017 Ordered   8  Vitamin D 1000 UNIT CAPS; TAKE 2 CAPSULE Daily; Therapy: 59EYU7609 to (Evaluate:74Xip1107); Last Rx:10Nov2014 Ordered   9  Warfarin Sodium 3 MG Oral Tablet; Take 1/2 to 1 tablet daily by mouth or as ordered by   physician; Therapy: 23EEH4258 to (Evaluate:25Jan2018)  Requested for: 93WVX5041; Last   Rx:30Jan2017 Ordered  Medication List Reviewed: The medication list was reviewed and updated today  Allergies  Medication    1  Penicillins    Vitals  Signs   Recorded: 05KGD7220 11:45AM   Heart Rate: 60, L Radial  Respiration: 16  Systolic: 143, LUE, Sitting  Diastolic: 70, LUE, Sitting  Height: 5 ft 8 in  Weight: 178 lb 0 4 oz  BMI Calculated: 27 07  BSA Calculated: 1 95  O2 Saturation: 98    Physical Exam  General Multi-System Exam [facility]:   Constitutional   General appearance: No acute distress, well appearing and well nourished      Eyes   Conjunctiva and lids: No erythema, swelling or discharge  Pupils and irises: Equal, round, reactive to light  Ophthalmoscopic examination: Normal fundi and optic discs  Ears, Nose, Mouth, and Throat   External inspection of ears and nose: Normal     Otoscopic examination: Tympanic membranes translucent with normal light reflex  Canals patent without erythema  Hearing: Normal     Conversational Hearing: Normal     Nasal mucosa, septum, and turbinates: Normal without edema or erythema  Lips, teeth, and gums: Normal, good dentition  Oropharynx: Normal with no erythema, edema, exudate or lesions  Neck   Neck: Supple, symmetric, trachea midline, no masses  Thyroid: Normal, no thyromegaly  Pulmonary   Respiratory effort: No increased work of breathing or signs of respiratory distress  Percussion of chest: Normal     Palpation of chest: Normal     Auscultation of lungs: Clear to auscultation  Cardiovascular   Palpation of heart: Normal PMI, no thrills  Auscultation of heart: Abnormal   irr irr  Carotid pulses: 2+ bilaterally  Abdominal aorta: Normal     Femoral pulses: 2+ bilaterally  Pedal pulses: 2+ bilaterally  Examination of extremities for edema and/or varicosities: Normal     Chest   Breasts: Normal, no dimpling or skin changes appreciated  Palpation of breasts and axillae: Normal, no masses palpated  Chest: Normal     Abdomen   Abdomen: Non-tender, no masses  Liver and spleen: No hepatomegaly or splenomegaly  Examination for hernias: No hernias appreciated  Anus, perineum, and rectum: Normal sphincter tone, no masses, no prolapse  Stool sample for occult blood: Negative  Genitourinary   Scrotal contents: Normal testes, no masses  Penis: Normal, no lesions  Digital rectal exam of prostate: Normal size, no masses  Lymphatic   Palpation of lymph nodes in neck: No lymphadenopathy  Palpation of lymph nodes in axillae: No lymphadenopathy      Palpation of lymph nodes in groin: No lymphadenopathy  Palpation of lymph nodes in other areas: No lymphadenopathy  Musculoskeletal   Gait and station: Normal     Inspection/palpation of digits and nails: Normal without clubbing or cyanosis  Inspection/palpation of joints, bones, and muscles: Normal     Range of motion: Normal     Stability: Normal     Muscle strength/tone: Normal     Skin   Skin and subcutaneous tissue: Normal without rashes or lesions  Palpation of skin and subcutaneous tissue: Normal turgor  Neurologic   Cranial nerves: Cranial nerves 2-12 intact  Reflexes: 2+ and symmetric  Sensation: No sensory loss  Psychiatric   Judgment and insight: Normal     Orientation to person, place and time: Normal     Recent and remote memory: Intact  Mood and affect: Normal        Future Appointments    Date/Time Provider Specialty Site   10/13/2017 11:00 AM OWEN Chaparro  Orthopedic Surgery [facility] [place] [facility]   07/05/2017 01:40 PM OWEN Lawrence   Cardiology [facility]     Signatures   Electronically signed by : OWEN Garcia ; Jul 5 2017 12:35PM EST                       (Author)

## 2018-01-15 NOTE — PROGRESS NOTES
REPORT NAME: Patient Visit Summary Report   VISIT DATE: 1/14/2016  VISIT TIME: 12:26 PM EST  PATIENT NAME: Veena Ruth  MEDICAL RECORD NUMBER: [id number]  SOCIAL SECURITY NUMBER:   YOB: 1925  AGE: 80  REFERRING PHYSICIAN: Alfonzo Leger  SUPERVISING CLINICIAN: Jose Daniel Marin CARE PROFESSIONAL: [facility]  PATIENT HOME ADDRESS: [address] [place] [address], [address] [place] [address], [address] [place] [address]  PATIENT HOME PHONE: [phone]  DIAGNOSIS 1: Unspecified atrial fibrillation / I48 91  DIAGNOSIS 2:   DIAGNOSIS 3:   DIAGNOSIS 4:   INR RANGE: 2 - 3  INR GOAL: 2 5  TREATMENT START DATE:   TREATMENT END DATE:   NEXT VISIT: 2/11/2016  [facility]    VISIT RESULTS   ENCOUNTER NUMBER:   TEST LOCATION: [facility]  TEST TYPE:   VISIT TYPE:   CURRENT INR: 2 38 PROTIME:   SPECIMEN COL AND RPT DATE: 1/14/2016 12:26 PM  EST    VITAL SIGNS  PULSE:  B/P:  WEIGHT:  HEIGHT:  TEMP:     CURRENT ANTICOAGULANT DOSING SCHEDULE  DOSE SIZE: 2 5mg    ANTICOAGULANT TYPE: WARFARIN  DOSING REGIMEN  Sun       Mon Tues Wed Thurs Fri       Sat  Total/Wk  1 25      2 5       1 25      1 25      1 25      2 5       1 25      11 25    PATIENT MEDICATION INSTRUCTION: Yes  PATIENT NUTRITIONAL COUNSELING: No  PATIENT BRUISING INSTRUCTION: No      LAST EDUCATION DATE:       PREVIOUS VISIT INFORMATION  VISITDATE   INR Goal  INR   Sun     Mon     Tues Wed Thurs   Fri  Sat     Total/wk  1/14/2016   2 5       2 38  1 25    2 5     1 25    1 25    1 25    2 5  1 25    11 25  12/17/2015  2 5       2 35  1 25    2 5     1 25    1 25    1 25    2 5  1 25    11 25  11/19/2015  2 5       2 39  1 25    2 5     1 25    1 25    1 25    2 5  1 25    11 25  10/27/2015  2 5       2 93  1 25    2 5     1 25    1 25    1 25    2 5  1 25    11 25    ADDITIONAL PREVIOUS VISIT INFORMATION  VISITDATE   PRIMARY RX               DOSE      CrCl  1/14/2016   WARFARIN                 2 5mg 12/17/2015  WARFARIN                 2 5mg               11/19/2015  WARFARIN                 2 5mg               10/27/2015  WARFARIN                 2 5mg                   OTHER CURRENT MEDICATIONS: WARFARIN      PROGRESS NOTES: gave dosage to pt  faxed orders to [employer], retest  in 4 wks      PATIENT INSTRUCTIONS:     TEST LOCATION: [place]    Electronically signed by: [facility] on 1/14/2016 at 12:26 PM EST

## 2018-01-15 NOTE — PROGRESS NOTES
REPORT NAME: Patient Visit Summary Report   VISIT DATE: 2/11/2016  VISIT TIME: 2:32 PM EST  PATIENT NAME: Sanchez [place]  MEDICAL RECORD NUMBER: [id number]  SOCIAL SECURITY NUMBER:   YOB: 1925  AGE: 80  REFERRING PHYSICIAN: Nam Ball  SUPERVISING CLINICIAN: Jose Daniel Marin CARE PROFESSIONAL: Sanaz Avitia  PATIENT HOME ADDRESS: [address] [place] [address]  PATIENT HOME PHONE: [phone]  DIAGNOSIS 1: Unspecified atrial fibrillation / I48 91  DIAGNOSIS 2:   DIAGNOSIS 3:   DIAGNOSIS 4:   INR RANGE: 2 - 3  INR GOAL: 2 5  TREATMENT START DATE:   TREATMENT END DATE:   NEXT VISIT: 3/10/2016  [facility]    VISIT RESULTS   ENCOUNTER NUMBER:   TEST LOCATION: [facility]  TEST TYPE:   VISIT TYPE:   CURRENT INR: 2 16 PROTIME:   SPECIMEN COL AND RPT DATE: 2/11/2016 2:32 PM  EST    VITAL SIGNS  PULSE:  B/P:  WEIGHT:  HEIGHT:  TEMP:     CURRENT ANTICOAGULANT DOSING SCHEDULE  DOSE SIZE: 2 5mg    ANTICOAGULANT TYPE: WARFARIN  DOSING REGIMEN  Sun       Mon Tues Wed Thurs Fri       Sat  Total/Wk  1 25      2 5       1 25      1 25      1 25      2 5       1 25      11 25    PATIENT MEDICATION INSTRUCTION: No  PATIENT NUTRITIONAL COUNSELING: No  PATIENT BRUISING INSTRUCTION: No      LAST EDUCATION DATE:       PREVIOUS VISIT INFORMATION  VISITDATE   INR Goal  INR   Sun     Mon Tues Wed Thurs   Fri  Sat     Total/wk  2/11/2016   2 5       2 16  1 25    2 5     1 25    1 25    1 25    2 5  1 25    11 25  1/14/2016   2 5       2 38  1 25    2 5     1 25    1 25    1 25    2 5  1 25    11 25  12/17/2015  2 5       2 35  1 25    2 5     1 25    1 25    1 25    2 5  1 25    11 25  11/19/2015  2 5       2 39  1 25    2 5     1 25    1 25    1 25    2 5  1 25    11 25    ADDITIONAL PREVIOUS VISIT INFORMATION  VISITDATE   PRIMARY RX               DOSE      CrCl  2/11/2016   WARFARIN                 2 5mg 1/14/2016   WARFARIN                 2 5mg               12/17/2015  WARFARIN                 2 5mg               11/19/2015  WARFARIN                 2 5mg                   OTHER CURRENT MEDICATIONS: WARFARIN      PROGRESS NOTES: faxed orders to [employer], retest inr in 4 wks      PATIENT INSTRUCTIONS:     TEST LOCATION: [place] [facility]    Electronically signed by: [facility] on 2/11/2016 at 2:31 PM EST

## 2018-01-15 NOTE — MISCELLANEOUS
Message  Pt called 05/12 and left a message that he doesn't want to take statin meds  He want to stay off it until his next office visit on 05/23  Return call on 05/12 and review Dr Sawyer Rosa office notes with him  Pt asked if i could f/u with Dr Sawyer Rosa re his question  Per Dr Sawyer Rosa pt should follow instruction from last OV this was communicated to wife Apoorva Franks 05/17  Active Problems    1  Abdominal aortic aneurysm (441 4) (I71 4)   2  Aortic regurgitation (424 1) (I35 1)   3  Atrial fibrillation (427 31) (I48 91)   4  BPH (benign prostatic hyperplasia) (600 00) (N40 0)   5  Carotid artery stenosis (433 10) (I65 29)   6  Edema of both legs (782 3) (R60 0)   7  Hematuria, microscopic (599 72) (R31 2)   8  Hyperlipidemia (272 4) (E78 5)   9  Hypertension (401 9) (I10)   10  Hypocalcemia (275 41) (E83 51)   11  Hypokalemia (276 8) (E87 6)   12  Hypothyroidism (244 9) (E03 9)   13  Lumbar foraminal stenosis (724 02) (M99 83)   14  Physician orders for life-sustaining treatment (POLST) form indicates patient wish for full    code resuscitation status (V49 89) (Z78 9)   15  Primary osteoarthritis of right knee (715 16) (M17 11)   16  Skin lesion of face (709 9) (L98 9)    Current Meds   1  AmLODIPine Besylate 2 5 MG Oral Tablet; TAKE 1 TABLET TWICE DAILY; Therapy: 99XRT3123 to (Evaluate:64Pvd7283)  Requested for: 61HKQ6303; Last   Rx:18Jan2016 Ordered   2  Atorvastatin Calcium 20 MG Oral Tablet; TAKE 1 TABLET DAILY; Therapy: 24Apr2015 to (Evaluate:16Jun2016)  Requested for: 78RRH8105; Last   Rx:22Jun2015 Ordered   3  EQL Fish Oil 1000 MG Oral Capsule; TAKE 1 CAPSULE DAILY; Therapy: 57JUV8718 to (Evaluate:28Nov2014); Last Rx:10Nov2014 Ordered   4  Zoe 0 5-0 4 MG Oral Capsule (Dutasteride-Tamsulosin HCl); take 1 capsule daily; Therapy: 23GAI6895 to (Evaluate:28Nov2014); Last Rx:10Nov2014 Ordered   5  Levothyroxine Sodium 75 MCG Oral Tablet; Take 1 tablet daily;    Therapy: 16OSC3237 to (Evaluate:69Gzu4827)  Requested for: 90TAK5104; Last   CR:87TTE3712; Status: ACTIVE - Retrospective By Protocol Authorization Ordered   6  Potassium Chloride Debi ER 20 MEQ Oral Tablet Extended Release; take 4 tablets daily   as directed; Therapy: 47Hjo6999 to (Evaluate:57Zid1874)  Requested for: 20Apr2016; Last   Rx:05Apr2016 Ordered   7  Vitamin D 1000 UNIT CAPS; TAKE 2 CAPSULE Daily; Therapy: 91NML1422 to (Evaluate:19Oly5720); Last Rx:10Nov2014 Ordered   8  Warfarin Sodium 2 5 MG Oral Tablet; Take 1/2 to 1 tablet daily by mouth or as ordered by   physician; Therapy: 50KLF2764 to (Evaluate:02Apr2016)  Requested for: 74 913 574; Last   Rx:08Apr2015 Ordered    Allergies    1  Penicillins    Plan  Hypertension    · AmLODIPine Besylate 2 5 MG Oral Tablet; TAKE 1 TABLET TWICE DAILY    Signatures   Electronically signed by :  OWEN Covarrubias ; May 18 2016  6:57AM EST

## 2018-01-16 NOTE — MISCELLANEOUS
Message  pt called 2/16 concerned because he did not receive doppler letter from recent cv doppler  also copy was sent to old pcp, not current pcp  he was directed to [facility] to see results and it is not there for review  informed pt I would f/u and get back to him  confirmed w/ [facility] that currently dopplers are not available for view, they will be eventually but they are not sure when  confirmed w/ Dr Carolina Cruz, he rev doppler, pt may be sent no change letter and repeat in 12mos  called pt and lmom req he call me back so I can update him  also per [facility] tech they take the info from registration so if registration did not update pcp they would not know it changed  emailed sanjana and req they send doppler letter to pt and emailed sched to place pt in f/u for doppler  s/w pt and informed of above, per Earlene Crawford she sent letter out today  pt ayana call if any further questions or concerns  1        1 Amended By: Milton Ortiz; Feb 18 2016 5:26 PM EST    Active Problems   1  Abdominal aortic aneurysm (441 4) (I71 4)  2  Aortic regurgitation (424 1) (I35 1)  3  Atrial fibrillation (427 31) (I48 91)  4  BPH (benign prostatic hyperplasia) (600 00) (N40 0)  5  Carotid artery stenosis (433 10) (I65 29)  6  Hematuria, microscopic (599 72) (R31 2)  7  Hyperlipemia (272 4) (E78 5)  8  Hypertension (401 9) (I10)  9  Hypocalcemia (275 41) (E83 51)  10  Hypokalemia (276 8) (E87 6)  11  Hypothyroidism (244 9) (E03 9)  12  Lumbar foraminal stenosis (724 02) (M99 83)  13  Physician orders for life-sustaining treatment (POLST) form indicates patient wish for full    code resuscitation status (V49 89) (Z78 9)  14  Primary localized osteoarthritis of right knee (715 16) (M17 11)  15  Primary osteoarthritis of right knee (715 16) (M17 11)  16  Shoulder pain (719 41) (M25 519)  17  Skin lesion of face (709 9) (L98 9)    Current Meds  1  AmLODIPine Besylate 2 5 MG Oral Tablet; TAKE 1 TABLET TWICE DAILY;    Therapy: 80TCO3945 to (Evaluate:87Pen6830)  Requested for: 12KGZ6235; Last   Rx:18Jan2016 Ordered  2  Atorvastatin Calcium 20 MG Oral Tablet; TAKE 1 TABLET DAILY; Therapy: 24Apr2015 to (Evaluate:16Jun2016)  Requested for: 87CQA6269; Last   Rx:22Jun2015 Ordered  3  EQL Fish Oil 1000 MG Oral Capsule; TAKE 1 CAPSULE DAILY; Therapy: 18DJL9679 to (Evaluate:28Nov2014); Last Rx:10Nov2014 Ordered  4  Zoe 0 5-0 4 MG Oral Capsule (Dutasteride-Tamsulosin HCl); take 1 capsule daily; Therapy: 90TFG3623 to (Evaluate:28Nov2014); Last Rx:10Nov2014 Ordered  5  Levothyroxine Sodium 75 MCG Oral Tablet; Take 1 tablet daily; Therapy: 56QOS6583 to (Sebas Irene)  Requested for: 17BLT7503; Last   Rx:04Jan2016 Ordered  6  Potassium Chloride Debi ER 20 MEQ Oral Tablet Extended Release; take 4 tablets daily   as directed; Therapy: 39Pnf6182 to (Evaluate:12Jan2016)  Requested for: 20Apr2015; Last   Rx:17Apr2015 Ordered  7  Vitamin D 1000 UNIT CAPS; TAKE 2 CAPSULE Daily; Therapy: 22WHN7335 to (Evaluate:32Ojo7763); Last Rx:10Nov2014 Ordered  8  Warfarin Sodium 2 5 MG Oral Tablet; Take 1/2 to 1 tablet daily by mouth or as ordered by   physician; Therapy: 49CXE0753 to (Evaluate:02Apr2016)  Requested for: 74 911 574; Last   Rx:08Apr2015 Ordered    Allergies   1   Penicillins    Signatures   Electronically signed by : Jessica Stanley, ; Feb 18 2016  5:27PM EST                       (Author)

## 2018-01-17 NOTE — PROGRESS NOTES
Assessment    1  Primary osteoarthritis of right knee (715 16) (M17 11)   2  Hypertension (401 9) (I10)   3  Hypothyroidism (244 9) (E03 9)   4  Hyperlipidemia (272 4) (E78 5)   5  Atrial fibrillation (427 31) (I48 91)   6  Abdominal aortic aneurysm, without rupture (441 4) (I71 4)    Plan    · [facility]  Consult Only: the expectation is  that the referring provider will communicate back to the patient on treatment options  Evaluation and Treatment: the expectation is that the referred to provider will  communicate back to the patient on treatment options  Status: Hold For - Scheduling   Requested for: 60DKM0648  Care Summary provided  : Yes   · 1 - Azalia Aviles MD, Ira Enriquez  (Cardiology) Physician Referral  Consult Only: the expectation is  that the referring provider will communicate back to the patient on treatment options  Evaluation and Treatment: the expectation is that the referred to provider will  communicate back to the patient on treatment options  Status: Active  Requested for:  53IZY1800  Care Summary provided  : Yes    · Warfarin Sodium 3 MG Oral Tablet; Take 1/2 tablet daily or as directed until futher  adjustment    · Ferrous Sulfate 325 (65 Fe) MG Oral Tablet   · Folic Acid 1 MG Oral Tablet    · Begin or continue regular aerobic exercise  Gradually work up to at least 3 sessions of  30 minutes of exercise a week ; Status:Complete;   Done: 61LXI1950 09:38AM   · Continue with our present treatment plan ; Status:Complete;   Done: 66FTU0999  09:38AM   · Eat a low fat and low cholesterol diet ; Status:Complete;   Done: 50CPQ7244 09:38AM   · Call 911 if: You experience a new kind of chest pain (angina) or pressure ;  Status:Complete;   Done: 20INI0030 09:38AM   · Call 911 if: You have any symptoms of a stroke ; Status:Complete;   Done: 56VHJ0154  09:38AM   · (1) CHOLESTEROL, TOTAL; Status:Active;  Requested for:23Dmw4552;    · (1) COMPREHENSIVE METABOLIC PANEL; Status:Active; Requested for:23May2017;    · (1) HDL,DIRECT; Status:Active; Requested for:23May2017;    · (1) LDL,DIRECT; Status:Active; Requested for:23May2017;     · From  Levothyroxine Sodium 75 MCG Oral Tablet Take 1 tablet daily To  Levothyroxine Sodium 88 MCG Oral Tablet TAKE 1 TABLET DAILY   · (1) TSH; Status:Active; Requested for:07Mar2017;    · Follow-up visit in 3 months Evaluation and Treatment  Follow-up  Status: Hold For -  Scheduling  Requested for: 86TTK0811    · TraMADol HCl - 50 MG Oral Tablet    · Protonix 40 MG Oral Tablet Delayed Release (Pantoprazole Sodium)    Discussion/Summary  Discussion Summary:   #1: Osteoarthritis: Stable  Status post replacement of right knee  Follows with orthopedics  #2: Hypertension: Stable  Blood pressure doing quite well today  No changes recommended  Reevaluate in proximal he 6 months  #3: Hypothyroidism: TSH was slightly high, so we adjusted the Synthroid up to 88 Âµg  Recheck in 3 months  Follow-up after that  #4: Hyperlipidemia: Cholesterol was quite good  No changes  Check in 6 months  #5: Atrial fibrillation: Patient follows with cardiology  No changes at the moment  He will see them again in January (that is the next scheduled visit)  #6: Abdominal aortic aneurysm: Patient does have a history of this in the chart  He has seen vascular in the past  We'll continue to follow with cardiology and vascular as appropriate  Chief Complaint  Chief Complaint Free Text Note Form: Pt here for routine visit- HTN,Hypothyroidism,HLP, S/P R knee replace      History of Present Illness  HPI: Patient is stable after his knee replacement  He is following with orthopedics  Seems to be doing quite well at this point  He is mentioning that there is some swelling in the lower extremity, at the knee  This is to be expected after the surgery according orthopedics  Please see copies of labs on the chart  Cholesterol is excellent  TSH was slightly high   Patient did mention that he has some fatigue frequently  This is been for a while now  He is on 76 Âµg Synthroid currently  Review of Systems  Complete-Male:   Constitutional: No fever or chills, feels well, no tiredness, no recent weight gain or weight loss  Eyes: No complaints of eye pain, no red eyes, no discharge from eyes, no itchy eyes  ENT: no complaints of earache, no hearing loss, no nosebleeds, no nasal discharge, no sore throat, no hoarseness  Cardiovascular: No complaints of slow heart rate, no fast heart rate, no chest pain, no palpitations, no leg claudication, no lower extremity  Respiratory: No complaints of shortness of breath, no wheezing, no cough, no SOB on exertion, no orthopnea or PND  Gastrointestinal: No complaints of abdominal pain, no constipation, no nausea or vomiting, no diarrhea or bloody stools  Genitourinary: No complaints of dysuria, no incontinence, no hesitancy, no nocturia, no genital lesion, no testicular pain  Musculoskeletal: No complaints of arthralgia, no myalgias, no joint swelling or stiffness, no limb pain or swelling  Integumentary: No complaints of skin rash or skin lesions, no itching, no skin wound, no dry skin  Neurological: No compliants of headache, no confusion, no convulsions, no numbness or tingling, no dizziness or fainting, no limb weakness, no difficulty walking  Psychiatric: Is not suicidal, no sleep disturbances, no anxiety or depression, no change in personality, no emotional problems  Endocrine: No complaints of proptosis, no hot flashes, no muscle weakness, no erectile dysfunction, no deepening of the voice, no feelings of weakness  Hematologic/Lymphatic: No complaints of swollen glands, no swollen glands in the neck, does not bleed easily, no easy bruising  Active Problems    1  Abdominal aortic aneurysm, without rupture (441 4) (I71 4)   2   Aftercare following right knee joint replacement surgery (V54 81,V43 65) (Z47 1,Z96 651) 3  AI (aortic insufficiency) (424 1) (I35 1)   4  Atrial fibrillation (427 31) (I48 91)   5  BPH (benign prostatic hyperplasia) (600 00) (N40 0)   6  Carotid stenosis (433 10) (I65 29)   7  Chronic pain of right knee (518 03,477 67) (M25 561,G89 29)   8  Edema of both legs (782 3) (R60 0)   9  Hematuria, microscopic (599 72) (R31 29)   10  Hyperlipidemia (272 4) (E78 5)   11  Hypertension (401 9) (I10)   12  Hypocalcemia (275 41) (E83 51)   13  Hypokalemia (276 8) (E87 6)   14  Hypothyroidism (244 9) (E03 9)   15  Lumbar foraminal stenosis (724 02) (M99 83)   16  Physician orders for life-sustaining treatment (POLST) form indicates patient wish for full    code resuscitation status (V49 89) (Z78 9)   17  Preoperative cardiovascular examination (V72 81) (Z01 810)   18  Primary osteoarthritis of right knee (715 16) (M17 11)   19  Skin lesion of face (709 9) (L98 9)   20  Stasis edema of right lower extremity (459 30) (I87 301)   21  Status post total right knee replacement (V43 65) (Z96 651)   22  Swelling of calf (729 81) (M79 89)   23  Thrombocytopenia (287 5) (D69 6)    Past Medical History    1  History of amaurosis fugax (V12 49) (Z86 69)   2  History of urinary tract infection (V13 02) (Z87 440)   3  History of Hyperthyroidism (242 90) (E05 90)   4  History of Inguinal hernia (550 90) (K40 90)   5  History of Postoperative state (V45 89) (Z98 890)   6  History of Shoulder pain (719 41) (M25 519)   7  History of Syncope (780 2) (R55)    Surgical History    1  History of Endarterectomy Internal Carotid With Eversion And End-To-End Anastomosis   2  History of Hernia Repair   3  History of Total Knee Replacement Right  Surgical History Reviewed: The surgical history was reviewed and updated today  Family History  Mother    1  No pertinent family history  Family History Reviewed: The family history was reviewed and updated today         Social History    · Being A Social Drinker   · Former smoker (N25 33) (I04 584)   ·   Social History Reviewed: The social history was reviewed and updated today  The social history was reviewed and is unchanged  Current Meds   1  AmLODIPine Besylate 2 5 MG Oral Tablet; TAKE 1 TABLET DAILY; Therapy: 49SRP9023 to (9593 5081)  Requested for: 31Oct2016 Recorded   2  Ascorbic Acid 500 MG CAPS; Vitamin C - Take 1 tablet twice daily; Therapy: (Recorded:31Oct2016) to Recorded   3  Atorvastatin Calcium 20 MG Oral Tablet; TAKE 1 TABLET DAILY; Therapy: 24Apr2015 to (Evaluate:07Jan2017)  Requested for: 51VRH7746; Last   Rx:17Omp6684 Ordered   4  EQL Fish Oil 1000 MG Oral Capsule; TAKE 1 CAPSULE DAILY; Therapy: 99UIT1997 to (Evaluate:28Nov2017); Last Rx:10Nov2014 Ordered   5  Ferrous Sulfate 325 (65 Fe) MG Oral Tablet; TAKE 1 TABLET BY MOUTH TWICE A DAY   WITH MEALS; Therapy: (Recorded:31Oct2016) to Recorded   6  Folic Acid 1 MG Oral Tablet; TAKE 1 TABLET DAILY; Therapy: (Recorded:31Oct2016) to Recorded   7  Zoe 0 5-0 4 MG Oral Capsule; take 1 capsule daily; Therapy: 34PUS9381 to (Evaluate:28Nov2017); Last Rx:10Nov2014 Ordered   8  Levothyroxine Sodium 75 MCG Oral Tablet; Take 1 tablet daily; Therapy: 94NGL3572 to (Evaluate:71Kfh5675)  Requested for: 41PFX3080; Last   Rx:64Ybo3698 Ordered   9  Potassium Chloride Debi ER 20 MEQ Oral Tablet Extended Release; take 4 tablets daily   as directed; Therapy: 03Haq8829 to (Evaluate:82Zmi2129)  Requested for: 20Apr2016; Last   Rx:05Apr2016 Ordered   10  Protonix 40 MG Oral Tablet Delayed Release; take 1 tablet by mouth once daily; Therapy: 45JXJ1393 to (Jennifer Merino) Recorded   11  TraMADol HCl - 50 MG Oral Tablet; TAKE 1 TABLET EVERY 6 HOURS AS NEEDED FOR    PAIN;    Therapy: 24Oct2016 to (Evaluate:23Nov2016) Recorded   12  Vitamin D 1000 UNIT CAPS; TAKE 2 CAPSULE Daily; Therapy: 20RUK1292 to (Evaluate:02Ydq6787); Last Rx:10Nov2014 Ordered   13  Warfarin Sodium 2 5 MG Oral Tablet;  Take 1/2 to 1 tablet daily by mouth or as ordered by    physician; Therapy: 79NKK3747 to (Albert Lackey)  Requested for: 24Oct2016 Recorded  Medication List Reviewed: The medication list was reviewed and updated today  Allergies    1  Penicillins    Vitals  Signs   Recorded: 45BWY3628 09:08AM   Heart Rate: 86, L Radial  Respiration: 18  Systolic: 589, LUE, Sitting  Diastolic: 72, LUE, Sitting  Height: 5 ft 8 in  Weight: 182 lb 0 64 oz  BMI Calculated: 27 68  BSA Calculated: 1 97  O2 Saturation: 97    Physical Exam    Constitutional   General appearance: No acute distress, well appearing and well nourished  Ears, Nose, Mouth, and Throat   External inspection of ears and nose: Normal     Oropharynx: Normal with no erythema, edema, exudate or lesions  Neck   Neck: Supple, symmetric, trachea midline, no masses  Thyroid: Normal, no thyromegaly  Pulmonary   Respiratory effort: No increased work of breathing or signs of respiratory distress  Auscultation of lungs: Clear to auscultation  Cardiovascular   Auscultation of heart: Normal rate and rhythm, normal S1 and S2, no murmurs  Examination of extremities for edema and/or varicosities: Normal     Abdomen   Abdomen: Non-tender, no masses  (No abdominal bruits)   Liver and spleen: No hepatomegaly or splenomegaly  Results/Data  PHQ-2 Adult Depression Screening 05Sbo0186 09:20AM User, [facility]     Test Name Result Flag Reference   PHQ-2 Adult Depression Score 0     Over the last two weeks, how often have you been bothered by any of the following problems?   Little interest or pleasure in doing things: Not at all - 0  Feeling down, depressed, or hopeless: Not at all - 0   PHQ-2 Adult Depression Screening Negative       Falls Risk Assessment (Dx Z13 89 Screen for Neurologic Disorder) 81COP2278 09:20AM User, [facility]     Test Name Result Flag Reference   Falls Risk      No falls in the past year       Future Appointments    Date/Time Provider Specialty Site 01/13/2017 02:15 PM OWEN Marie  Orthopedic Surgery [address] AND SURGICAL [facility]   01/05/2017 09:20 AM OWEN Quiroga  Cardiology [facility]     Signatures   Electronically signed by :  OWEN Lamas ; Dec  7 2016  9:42AM EST                       (Author)

## 2018-01-18 NOTE — MISCELLANEOUS
Message  Pt came to the [facility] c/o constant productive cough x 2days  Reported that wife has similar Sx but seem to worst    Rapid flu done in office negative  Vitals /70, HR 78, Temp 99 1 and O2 98%   Per Dr Bev Smith start Prophylactic Tx since his wife tested positive for influenza A  Tamiflu 75mg qd x10 days sent [facility]  Active Problems    1  Abdominal aortic aneurysm, without rupture (441 4) (I71 4)   2  Aftercare following right knee joint replacement surgery (V54 81,V43 65) (Z47 1,Z96 651)   3  AI (aortic insufficiency) (424 1) (I35 1)   4  Atrial fibrillation (427 31) (I48 91)   5  BPH (benign prostatic hyperplasia) (600 00) (N40 0)   6  Carotid stenosis (433 10) (I65 29)   7  Chronic pain of right knee (785 94,134 73) (M25 561,G89 29)   8  Edema of both legs (782 3) (R60 0)   9  Hematuria, microscopic (599 72) (R31 29)   10  Hyperlipidemia (272 4) (E78 5)   11  Hypertension (401 9) (I10)   12  Hypocalcemia (275 41) (E83 51)   13  Hypokalemia (276 8) (E87 6)   14  Hypothyroidism (244 9) (E03 9)   15  Influenza (487 1) (J11 1)   16  Lumbar foraminal stenosis (724 02) (M99 83)   17  Physician orders for life-sustaining treatment (POLST) form indicates patient wish for full    code resuscitation status (V49 89) (Z78 9)   18  Preoperative cardiovascular examination (V72 81) (Z01 810)   19  Primary osteoarthritis of right knee (715 16) (M17 11)   20  Skin lesion of face (709 9) (L98 9)   21  Stasis edema of right lower extremity (459 30) (I87 301)   22  Status post total right knee replacement (V43 65) (Z96 651)   23  Swelling of calf (729 81) (M79 89)   24  Thrombocytopenia (287 5) (D69 6)    Current Meds   1  AmLODIPine Besylate 2 5 MG Oral Tablet; TAKE 1 TABLET DAILY; Therapy: 74AYW4235 to ([phone])  Requested for: 31Oct2016 Recorded   2  Ascorbic Acid 500 MG CAPS; Vitamin C - Take 1 tablet twice daily; Therapy: (Recorded:31Oct2016) to Recorded   3   Atorvastatin Calcium 20 MG Oral Tablet; TAKE 1 TABLET DAILY; Therapy: 92Rql0513 to (Evaluate:07Jan2017)  Requested for: 83WED7062; Last   Rx:11Cmg2313 Ordered   4  EQL Fish Oil 1000 MG Oral Capsule; TAKE 1 CAPSULE DAILY; Therapy: 60UUV5122 to (Evaluate:28Nov2017); Last Rx:10Nov2014 Ordered   5  Zoe 0 5-0 4 MG Oral Capsule; take 1 capsule daily; Therapy: 01MHZ4470 to (Evaluate:28Nov2017); Last Rx:10Nov2014 Ordered   6  Levothyroxine Sodium 88 MCG Oral Tablet; TAKE 1 TABLET DAILY; Therapy: 31FFG8282 to (Evaluate:05Jun2017)  Requested for: 55BZE7587; Last   Rx:98Rog2492 Ordered   7  Potassium Chloride Debi ER 20 MEQ Oral Tablet Extended Release; take 4 tablets daily   as directed; Therapy: 68Ycq2414 to (Evaluate:15Hqs1646)  Requested for: 20Apr2016; Last   Rx:05Apr2016 Ordered   8  Vitamin D 1000 UNIT CAPS; TAKE 2 CAPSULE Daily; Therapy: 93ZFD1647 to (Evaluate:08Yjd2790); Last Rx:10Nov2014 Ordered   9  Warfarin Sodium 3 MG Oral Tablet; Take 1/2 tablet daily or as directed until futher   adjustment; Therapy: 70STK8029 to (Last Rx:72Ntc7007)  Requested for: 41Vsd4939 Ordered    Allergies    1  Penicillins    Plan  Influenza    · Tamiflu 75 MG Oral Capsule (Oseltamivir Phosphate); Take one tablet daily    Signatures   Electronically signed by :  OWEN Schuler Dec 19 2016 10:10AM EST

## 2018-01-22 VITALS
BODY MASS INDEX: 26.98 KG/M2 | SYSTOLIC BLOOD PRESSURE: 124 MMHG | RESPIRATION RATE: 16 BRPM | HEIGHT: 68 IN | DIASTOLIC BLOOD PRESSURE: 70 MMHG | OXYGEN SATURATION: 98 % | WEIGHT: 178.03 LBS | HEART RATE: 60 BPM

## 2018-03-07 NOTE — PROCEDURES
Procedure    Surgeon: DR Edgar Boyle   Procedure: RIGHT TKA   Cognitive Assessment   Cognitive Assessment: Mini- Cog Total Score: LEVEL 2   THE PATIENT IS A LEVEL 2  IF THE PATIENT EXHIBITS FTT I WOULD RECOMMEND INPATIENT GERIATRICS EVAL DR BYRNE IF NEEDED   Depression Screening   Depression Screening: Total Score:   Cardiac Risk Factors Include:   1) AORTIC INSUFFICIENCY    2) AFIB   3) CAROTID STENOSIS    4) EDEMA OF BOTH LEGS    5) HTN    6) HYPERLIPIDEMIA      HYPOKALEMIA, HYPOCALCEMIA,   Pulmonary Risk Factors Include:   1)    Patient was provided and educated on an incentive spirometer  Functional/Performance Assessment   The patient answered of the 8 questions with "No"  A caregiver does not assist patient   Able to stand up from a chair by themselves and on the first try  Able to get dressed by self  Able to bathe by self  Unable to make own meals  Able to get to bathroom by self  There is a bathroom in the home on the same floor they will sepnd most of their time in after surgery  Patient is able to obtain assistance after surgery in their home from a relative or other caregiver that does not reside with them  Home health services have not been utilized in the past year   ~He/She does not own or use adaptive equipment and has experienced fall(s) in the last year  Frailty Assessment   Frailty Score: Siva Ingles Gait & Mobility Assessment Patient required more than 15 seconds to complete TGUT  inpatient physical therapy consult is appropriate  Nutritional Assessment r /rs  had not experienced unexplained weightloss in the past year  Caregiver burden score: (2) Moderate burden   Summary: 1  COGNITIVE SCREENING- LEVEL 2  PATIENT IS ABLE TO DRAW A CLOCK BUT CANNOT REMEMBER ANY OF THE 3 WORDS THAT WAS GIVEN TO HIM BEFORE THE ACTIVITY  I WOULD RECOMMEND INPATIENT GERIATRIC SCREENING WITH DR BYRNE IF PATIENT EXHIBITS SIGNS AND SYMPTOMS ARE POSTOP DELIRIUM       2  DEPRESSION SCREENING- NO ACTIVE CONCERNS      3  FUNCTIONAL PERFORMANCE- PATIENT IS PRE-FRAIL  PASSED TUGT  DENIES FALLS  lIVES IN A SENIOR HIGH RISE AT [facility]  PLAN OF CARE WOULD BE TO HAVE SURGERY IN [facility] AND THEN BACK THROUGH THE [facility] SECTION     4  NUTRITION ASSESSMENT- DENIES WEIGHT LOSS    5  CAREGIVER BURDEN DENIES NEEDS  WIFE IS PRESENT DURING THE EXAM AND AGREES THAT THEY WILL BE ABLE TO MANAGE AFTER SURGERY, THROUGH [facility] FACILITIES  POC- PATIENT PLAN IS TO HAVE SURGERY AND THEN GO TO [facility] FOR THERAPY, AFTER REHAB PATIENT HE WILL BACK TO THE INDEPENDENT [facility]  Active Problems    1  Abdominal aortic aneurysm, without rupture (441 4) (I71 4)   2  AI (aortic insufficiency) (424 1) (I35 1)   3  Atrial fibrillation (427 31) (I48 91)   4  BPH (benign prostatic hyperplasia) (600 00) (N40 0)   5  Carotid stenosis (433 10) (I65 29)   6  Chronic pain of right knee (769 88,884 47) (M25 561,G89 29)   7  Edema of both legs (782 3) (R60 0)   8  Hematuria, microscopic (599 72) (R31 29)   9  Hyperlipidemia (272 4) (E78 5)   10  Hypertension (401 9) (I10)   11  Hypocalcemia (275 41) (E83 51)   12  Hypokalemia (276 8) (E87 6)   13  Hypothyroidism (244 9) (E03 9)   14  Lumbar foraminal stenosis (724 02) (M99 83)   15  Physician orders for life-sustaining treatment (POLST) form indicates patient wish for full    code resuscitation status (V49 89) (Z78 9)   16  Preoperative cardiovascular examination (V72 81) (Z01 810)   17  Primary osteoarthritis of right knee (715 16) (M17 11)   18  Skin lesion of face (709 9) (L98 9)    Current Meds    1  Warfarin Sodium 2 5 MG Oral Tablet; Take 1/2 to 1 tablet daily by mouth or as ordered by   physician; Therapy: 45IRJ1398 to (Evaluate:02Apr2016)  Requested for: 88 799 574; Last   Rx:08Apr2015 Ordered    2  Atorvastatin Calcium 20 MG Oral Tablet; TAKE 1 TABLET DAILY;    Therapy: 24Apr2015 to (Evaluate:07Jan2017)  Requested for: 88BLM0404; Last   Rx:22Eod4881 Ordered    3  Zoe 0 5-0 4 MG Oral Capsule (Dutasteride-Tamsulosin HCl); take 1 capsule daily; Therapy: 69QMU6712 to (Evaluate:28Nov2014); Last Rx:10Nov2014 Ordered   4  Vitamin D 1000 UNIT CAPS; TAKE 2 CAPSULE Daily; Therapy: 12GEN0931 to (Evaluate:79Tsv3364); Last Rx:10Nov2014 Ordered    5  EQL Fish Oil 1000 MG Oral Capsule; TAKE 1 CAPSULE DAILY; Therapy: 82NPU8952 to (Evaluate:28Nov2014); Last Rx:10Nov2014 Ordered    6  AmLODIPine Besylate 2 5 MG Oral Tablet; Take 1 tablet daily; Therapy: 34RNE2786 to (Evaluate:43Unz2821)  Requested for: 83NUQ2101; Last   Rx:58Blc5431 Ordered    7  Potassium Chloride Debi ER 20 MEQ Oral Tablet Extended Release; take 4 tablets daily   as directed; Therapy: 21Lvn8086 to (Evaluate:73Hca6104)  Requested for: 79Vcn8649; Last   Rx:13Dpg1909 Ordered    8  Levothyroxine Sodium 75 MCG Oral Tablet; Take 1 tablet daily; Therapy: 96JIY1026 to (Evaluate:59Rsi6099)  Requested for: 43IMX4466; Last   Rx:73Rmv7612 Ordered    Allergies    1   Penicillins    Signatures   Electronically signed by : KOLTON Christina; Oct  6 2016  4:28PM EST                       (Author)    Electronically signed by : KOLTON Christina; Oct  6 2016  4:29PM EST                       (Author)    Electronically signed by : OWEN Montano ; Oct 11 2016  7:34AM EST

## 2018-07-05 DIAGNOSIS — I71.4 ABDOMINAL AORTIC ANEURYSM WITHOUT RUPTURE (HCC): ICD-10-CM

## 2024-04-26 NOTE — ED PROVIDER NOTES
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 which at that time was concerning for dissection  Differential at this time includes extension of his aortic dissection, possibly involving his coronaries; also consider ACS versus PE  -iStat chem8, stat CT dissection study  -CMP, CBC, troponin  -EKG  -1L saline bolus  -I offered the patient pain medication, but he refused  Will also not give aspirin at this time given his history of dissection, and possible worsening status  -will re-evaluate, likely admission    ED COURSE    -Labwork and imaging reviewed - CT study concerning for ruptured descending aorta, with active extravasation  I discussed this study with the radiologist personally, who confirmed these findings   -I discussed this case with the cardiothoracic surgeon on call, who reviewed the films  He recommends that aside from surgical correction, there are no less invasive approaches or endovascular approaches that would be indicated at this time   -I discussed the radiographic findings with the patient, as well as the need for urgent surgical repair  The patient is currently DNR level 3, and states that he does not want any surgical procedures pursued at that time  I had a long conversation with the patient at bedside with his wife participating over a cellular phone, and discussed the poor prognosis of this CT finding, and that this is likely a non-surviveable injury without surgical intervention, and that short of surgical correction, there are also no other viable treatments  The patient stated he would like to continue with his previous plan of not pursuing any surgical options at this time  I made the patient aware that he will likely die without surgery, and he acknowledged understanding of this fact   At this time, the patient would prefer to pursue comfort measures only for treating his aorta rupture  -On re-evaluation, the patient does remain hemodynamically stable, though borderline tachycardic  He appears significantly more uncomfortable compared to my prior exam  I did offer the patient pain medication at this time, and he stated he would only like tylenol  Tylenol was administered  -I spoke with the palliative care physician on call at approximately 0600 to discuss this case  Their team will see the patient, and inpatient hospice services will be consulted  The plan at this time will be to either admit the patient to inpatient hospice, or discharge to another facility capable of hospice services  As noted above, treatment will consist of comfort measures only from this point forward  ED COURSE    History  Chief Complaint   Patient presents with    Chest Pain     Per EMS, pt c/o CP for past few days, but worse tonight  Pt has no pain upon arrival to ED after recieving nitro in EMS  51-year-old male with history of Atrial fibrillation, carotid stenosis, hyperlipidemia, hypertension, 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 approximately 2 weeks ago based on the CTA which revealed intramural hematoma in the descending aorta, refused surgical correction; presents with chest pain  Patient states the pain started 2 days ago, he describes as a "pressure"  Patient states that it radiated to his back  He describes no other radiation  He says the pain was constant in nature  He states that while the back pain feels similar to when he presented 2 weeks ago was diagnosed with dissection, he states the chest pain is a bit different, describes that prior chest pain is sharp  He denies any nausea, vomiting, diarrhea, abdominal pain, fevers, chills  He also denies shortness of breath  His pain improved after EMS gave him nitro    At that time, his blood pressure dropped from the 310 systolic to the 532 systolic  Prior to Admission Medications   Prescriptions Last Dose Informant Patient Reported? Taking? Dutasteride-Tamsulosin HCl (TROY) 0 5-0 4 MG CAPS 11/14/2017 at morning  Yes Yes   Sig: Take 1 capsule by mouth daily   Mirabegron (MYRBETRIQ PO) 11/14/2017 at morning  Yes Yes   Sig: Take 50 mg by mouth daily   amLODIPine (NORVASC) 10 mg tablet 11/14/2017 at morning  No Yes   Sig: Take 1 tablet by mouth daily   atorvastatin (LIPITOR) 20 mg tablet 11/14/2017 at evening  Yes Yes   Sig: Take 10 mg by mouth daily after dinner     levothyroxine 88 mcg tablet 11/14/2017 at morning  Yes Yes   Sig: Take 88 mcg by mouth daily   metoprolol tartrate (LOPRESSOR) 25 mg tablet 11/14/2017 at evening  No Yes   Sig: Take 0 5 tablets by mouth every 12 (twelve) hours   potassium chloride (K-DUR,KLOR-CON) 20 mEq tablet 11/14/2017 at morning  No Yes   Sig: Take 2 tablets by mouth daily TAKE 4 TABS DAILY   warfarin (COUMADIN) 3 mg tablet 11/14/2017 at morning  Yes Yes   Sig: Take 1 5 mg by mouth daily Except Monday    warfarin (COUMADIN) 3 mg tablet Past Week at monday  Yes Yes   Sig: Take 3 mg by mouth Monday      Facility-Administered Medications: None       Past Medical History:   Diagnosis Date    AAA (abdominal aortic aneurysm) (HCC)     AI (aortic insufficiency)     Arthritis     OSTEO    Atrial fibrillation (HCC)     BPH (benign prostatic hyperplasia)     Coronary artery disease     Disease of thyroid gland     HYPO    Hyperlipidemia     Hyperlipidemia     Hypertension        Past Surgical History:   Procedure Laterality Date    CAROTID ENDARTARECTOMY      CAROTID ENDARTERECTOMY Left     HERNIA REPAIR      HI TOTAL KNEE ARTHROPLASTY Right 10/19/2016    Procedure: TOTAL KNEE ARTHROPLASTY ;  Surgeon: Brenda Truong MD;  Location: [facility];  Service: Orthopedics       History reviewed  No pertinent family history  I have reviewed and agree with the history as documented      Social History   Substance Use Topics    Smoking status: Former Smoker     Quit date: 1990    Smokeless tobacco: Never Used    Alcohol use No        Review of Systems   Constitutional: Negative for chills  Physical Exam  ED Triage Vitals [11/15/17 0418]   Temperature Pulse Respirations Blood Pressure SpO2   98 6 °F (37 °C) 84 18 145/80 95 %      Temp Source Heart Rate Source Patient Position - Orthostatic VS BP Location FiO2 (%)   Oral Monitor Lying Left arm --      Pain Score       No Pain           Orthostatic Vital Signs  Vitals:    11/15/17 0529 11/15/17 0615 11/15/17 0711 11/15/17 0915   BP: 167/81 169/93 150/80 148/70   Pulse: 86 88 85 78   Patient Position - Orthostatic VS: Lying Lying Lying        Physical Exam    ED Medications  Medications   sodium chloride 0 9 % bolus 1,000 mL (0 mL Intravenous Stopped 11/15/17 0806)   iohexol (OMNIPAQUE) 350 MG/ML injection (MULTI-DOSE) 100 mL (100 mL Intravenous Given 11/15/17 0524)       Diagnostic Studies  Results Reviewed     Procedure Component Value Units Date/Time    CBC and differential [[id number]]  (Normal) Collected:  11/15/17 0430    Lab Status:  Final result Specimen:  Blood from Arm, Left Updated:  11/15/17 0520     WBC 9 00 Thousand/uL      RBC 4 65 Million/uL      Hemoglobin 14 2 g/dL      Hematocrit 40 7 %      MCV 88 fL      MCH 30 5 pg      MCHC 34 9 g/dL      RDW 13 5 %      MPV 11 3 fL      Platelets 334 Thousands/uL      nRBC 0 /100 WBCs     Narrative: This is an appended report  These results have been appended to a previously verified report  APTT [[id number]]  (Abnormal) Collected:  11/15/17 0430    Lab Status:  Final result Specimen:  Blood from Arm, Left Updated:  11/15/17 0511     PTT 60 (H) seconds     Narrative:          Therapeutic Heparin Range = 60-90 seconds    POCT Chem 8+ [[id number]]  (Abnormal) Collected:  11/15/17 0450    Lab Status:  Final result Updated:  11/15/17 0455     SODIUM, I-STAT 139 mmol/l      Potassium, i-STAT 3 8 mmol/L Chloride, istat 104 mmol/L      CO2, i-STAT 24 mmol/L      Anion Gap, Istat 16 (H) mmol/L      Calcium, Ionized i-STAT 1 09 (L) mmol/L      BUN, I-STAT 13 mg/dl      Creatinine, i-STAT 0 7 mg/dl      eGFR 83 ml/min/1 73sq m      Glucose, i-STAT 110 mg/dl      Hct, i-STAT 41 %      Hgb, i-STAT 13 9 g/dl      Specimen Type VENOUS    Comprehensive metabolic panel [[id number]]  (Abnormal) Collected:  11/15/17 0430    Lab Status:  Final result Specimen:  Blood from Arm, Left Updated:  11/15/17 0455     Sodium 141 mmol/L      Potassium 3 7 mmol/L      Chloride 107 mmol/L      CO2 27 mmol/L      Anion Gap 7 mmol/L      BUN 13 mg/dL      Creatinine 0 68 mg/dL      Glucose 105 mg/dL      Calcium 8 5 mg/dL      AST 50 (H) U/L      ALT 73 U/L      Alkaline Phosphatase 81 U/L      Total Protein 6 3 (L) g/dL      Albumin 2 5 (L) g/dL      Total Bilirubin 1 11 (H) mg/dL      eGFR 83 ml/min/1 73sq m     Narrative:         National Kidney Disease Education Program recommendations are as follows:  GFR calculation is accurate only with a steady state creatinine  Chronic Kidney disease less than 60 ml/min/1 73 sq  meters  Kidney failure less than 15 ml/min/1 73 sq  meters  Protime-INR [[id number]]  (Abnormal) Collected:  11/15/17 0430    Lab Status:  Final result Specimen:  Blood from Arm, Left Updated:  11/15/17 0451     Protime 39 3 (H) seconds      INR 3 95 (H)    POCT troponin [[id number]]  (Normal) Collected:  11/15/17 0433    Lab Status:  Final result Updated:  11/15/17 0447     POC Troponin I 0 00 ng/ml      Specimen Type VENOUS    Narrative:         [employer] i-Stat handheld analyzer 99% cutoff is > 0 08ng/mL in network Emergency Departments    o cTnI 99% cutoff is useful only when applied to patients in the clinical setting of myocardial ischemia  o cTnI 99% cutoff should be interpreted in the context of clinical history, ECG findings and possibly cardiac imaging to establish correct diagnosis    o cTnI 99% cutoff may be suggestive but clearly not indicative of a coronary event without the clinical setting of myocardial ischemia  CTA dissection protocol chest and abdomen   Final Result by Radha Dawson DO (11/15 8675)   1  Focal area of extravasation of contrast material in the proximal descending thoracic aorta  Increased soft tissue surrounding the aorta with bilateral pleural effusions  Findings most compatible with aortic rupture  2   Fusiform infrarenal abdominal aortic aneurysm, extending into the common iliac arteries bilaterally  3   Cardiomegaly with pulmonary artery hypertension  4   Prostatomegaly  ##cfslh   I personally reviewed the examination with Nitish Harris on 11/15/2017 5:41 AM    ##         Workstation performed: [id number]               Procedures  Procedures      Phone Consults  ED Phone Contact    ED Course  ED Course            Identification of Seniors at [address] [place] [address] Most Recent Value   (ISAR) Identification of Seniors at Risk   Before the illness or injury that brought you to the Emergency, did you need someone to help you on a regular basis? 1 Filed at: 11/15/2017 0420   In the last 24 hours, have you needed more help than usual?  0 Filed at: 11/15/2017 7581   Have you been hospitalized for one or more nights during the past 6 months? 1 Filed at: 11/15/2017 0420   In general, do you see well?  0 Filed at: 11/15/2017 0420   In general, do you have serious problems with your memory? 0 Filed at: 11/15/2017 5813   Do you take more than three different medications every day?   1 Filed at: 11/15/2017 0420   ISAR Score  3 Filed at: 11/15/2017 0420                          [facility]  CritCare Time    Disposition  Final diagnoses:   Ruptured thoracic aortic aneurysm [facility])     Time reflects when diagnosis was documented in both MDM as applicable and the Disposition within this note     Time User Action Codes Description Comment    11/15/2017  6:06 AM Elvai Arteaga Add [I71 1] Thoracic aneurysm, ruptured ([facility] )     11/16/2017  2:09 PM Creasie [place] Add [I71 1] Ruptured thoracic aortic aneurysm ([facility] )     11/16/2017  2:09 PM Creasie [place] Modify [I71 1] Thoracic aneurysm, ruptured ([facility] )     11/16/2017  2:09 PM Creasie [place] Modify [I71 1] Ruptured thoracic aortic aneurysm [facility])       ED Disposition     ED Disposition Condition Comment    Discharge  [address] discharge to home/self care      Condition at discharge: Fair        Follow-up Information     Follow up With Specialties Details Why [facility] team  Follow up          Discharge Medication List as of 11/15/2017 10:16 AM      START taking these medications    Details   LORazepam (ATIVAN) 2 mg/mL Infuse 0 25 mL into a venous catheter every 4 (four) hours as needed for anxiety for up to 10 days, Starting Wed 11/15/2017, Until Sat 11/25/2017, No Print      morphine 2 mg/mL Infuse 1 mL into a venous catheter every hour as needed for severe pain for up to 10 days Max Daily Amount: 48 mg, Starting Wed 11/15/2017, Until Sat 11/25/2017, No Print         CONTINUE these medications which have NOT CHANGED    Details   amLODIPine (NORVASC) 10 mg tablet Take 1 tablet by mouth daily, Starting Sat 11/11/2017, Print      levothyroxine 88 mcg tablet Take 88 mcg by mouth daily, Historical Med      metoprolol tartrate (LOPRESSOR) 25 mg tablet Take 0 5 tablets by mouth every 12 (twelve) hours, Starting Sat 11/11/2017, Print         STOP taking these medications       atorvastatin (LIPITOR) 20 mg tablet Comments:   Reason for Stopping:         Dutasteride-Tamsulosin HCl (TROY) 0 5-0 4 MG CAPS Comments:   Reason for Stopping:         Mirabegron (MYRBETRIQ PO) Comments:   Reason for Stopping:         potassium chloride (K-DUR,KLOR-CON) 20 mEq tablet Comments:   Reason for Stopping:         warfarin (COUMADIN) 3 mg tablet Comments:   Reason for Stopping:         warfarin (COUMADIN) 3 mg tablet Comments:   Reason for Stopping: Outpatient Discharge Orders  Discharge Diet     Discharge Condtion:  Terminal     Patient Aware of Diagnosis: Yes     Free of Communicable Disease: Yes     Activity:  As Tolerated         ED Provider  Attending physically available and evaluated Antolin Ash  I managed the patient along with the ED Attending      Electronically Signed by         Chriss Fitch MD  Resident  11/18/17 7531 4 = No assist / stand by assistance

## 2024-12-10 NOTE — SOCIAL WORK
CM met pt at bedside and made aware of CM role at discharge  Pt reported that he lives with his wife at [facility] independent living facility  Pt reported that he lives on the 3rd floor apt with no BRENDA to the building and an elevator available to access his apt floor  Pt reported that he has a LW and POA is his wife Peter Boyd  Pt's LW is in medical records  CM encouraged pt to give hospital a copy of POA papers  Pt has no DME  Pt has hx of STR with [facility] and acute rehab with [facility]  Pt denies hx with HHC, alc, drug and psych tx  Preferred pharmacy is [facility] in [place]  Pt's wife will transport pt when medically clear for d/c  CM will follow  CM reviewed d/c planning process including the following: identifying help at home, patient preference for d/c planning needs, Discharge Lounge, [facility] Meds to Bed program, availability of treatment team to discuss questions or concerns patient and/or family may have regarding understanding medications and recognizing signs and symptoms once discharged  CM also encouraged patient to follow up with all recommended appointments after discharge  Patient advised of importance for patient and family to participate in managing patients medical well being  FAMILY HISTORY:  Mother  Still living? No  Family history of stroke, Age at diagnosis: Age Unknown